# Patient Record
Sex: MALE | Race: BLACK OR AFRICAN AMERICAN | NOT HISPANIC OR LATINO | URBAN - METROPOLITAN AREA
[De-identification: names, ages, dates, MRNs, and addresses within clinical notes are randomized per-mention and may not be internally consistent; named-entity substitution may affect disease eponyms.]

---

## 2016-05-17 RX ORDER — QUETIAPINE FUMARATE 200 MG/1
3 TABLET, FILM COATED ORAL
Qty: 30 | Refills: 0 | DISCHARGE
Start: 2016-05-17 | End: 2016-06-16

## 2017-03-25 ENCOUNTER — EMERGENCY (EMERGENCY)
Facility: HOSPITAL | Age: 57
LOS: 1 days | Discharge: OTHER INSTITUTION | End: 2017-03-25
Attending: EMERGENCY MEDICINE
Payer: MEDICAID

## 2017-03-25 VITALS
WEIGHT: 229.94 LBS | RESPIRATION RATE: 20 BRPM | OXYGEN SATURATION: 99 % | SYSTOLIC BLOOD PRESSURE: 174 MMHG | HEIGHT: 72 IN | HEART RATE: 96 BPM | DIASTOLIC BLOOD PRESSURE: 108 MMHG | TEMPERATURE: 98 F

## 2017-03-25 VITALS
SYSTOLIC BLOOD PRESSURE: 112 MMHG | RESPIRATION RATE: 20 BRPM | HEART RATE: 61 BPM | DIASTOLIC BLOOD PRESSURE: 77 MMHG | OXYGEN SATURATION: 98 % | TEMPERATURE: 98 F

## 2017-03-25 DIAGNOSIS — Z98.890 OTHER SPECIFIED POSTPROCEDURAL STATES: ICD-10-CM

## 2017-03-25 DIAGNOSIS — Z96.652 PRESENCE OF LEFT ARTIFICIAL KNEE JOINT: ICD-10-CM

## 2017-03-25 DIAGNOSIS — Z98.89 OTHER SPECIFIED POSTPROCEDURAL STATES: Chronic | ICD-10-CM

## 2017-03-25 DIAGNOSIS — F20.0 PARANOID SCHIZOPHRENIA: ICD-10-CM

## 2017-03-25 DIAGNOSIS — R69 ILLNESS, UNSPECIFIED: ICD-10-CM

## 2017-03-25 DIAGNOSIS — F32.9 MAJOR DEPRESSIVE DISORDER, SINGLE EPISODE, UNSPECIFIED: ICD-10-CM

## 2017-03-25 DIAGNOSIS — J45.909 UNSPECIFIED ASTHMA, UNCOMPLICATED: ICD-10-CM

## 2017-03-25 DIAGNOSIS — I10 ESSENTIAL (PRIMARY) HYPERTENSION: ICD-10-CM

## 2017-03-25 DIAGNOSIS — E78.00 PURE HYPERCHOLESTEROLEMIA, UNSPECIFIED: ICD-10-CM

## 2017-03-25 DIAGNOSIS — Z96.652 PRESENCE OF LEFT ARTIFICIAL KNEE JOINT: Chronic | ICD-10-CM

## 2017-03-25 LAB
AMPHET UR-MCNC: NEGATIVE — SIGNIFICANT CHANGE UP
ANION GAP SERPL CALC-SCNC: 15 MMOL/L — SIGNIFICANT CHANGE UP (ref 5–17)
APAP SERPL-MCNC: <7.5 UG/ML — LOW (ref 10–26)
APPEARANCE UR: CLEAR — SIGNIFICANT CHANGE UP
BACTERIA # UR AUTO: ABNORMAL
BARBITURATES UR SCN-MCNC: NEGATIVE — SIGNIFICANT CHANGE UP
BASOPHILS # BLD AUTO: 0 K/UL — SIGNIFICANT CHANGE UP (ref 0–0.2)
BASOPHILS NFR BLD AUTO: 0.2 % — SIGNIFICANT CHANGE UP (ref 0–2)
BENZODIAZ UR-MCNC: NEGATIVE — SIGNIFICANT CHANGE UP
BILIRUB UR-MCNC: NEGATIVE — SIGNIFICANT CHANGE UP
BUN SERPL-MCNC: 17 MG/DL — SIGNIFICANT CHANGE UP (ref 8–20)
CALCIUM SERPL-MCNC: 9.2 MG/DL — SIGNIFICANT CHANGE UP (ref 8.6–10.2)
CHLORIDE SERPL-SCNC: 105 MMOL/L — SIGNIFICANT CHANGE UP (ref 98–107)
CO2 SERPL-SCNC: 21 MMOL/L — LOW (ref 22–29)
COCAINE METAB.OTHER UR-MCNC: POSITIVE
COLOR SPEC: YELLOW — SIGNIFICANT CHANGE UP
CREAT SERPL-MCNC: 1.06 MG/DL — SIGNIFICANT CHANGE UP (ref 0.5–1.3)
DIFF PNL FLD: NEGATIVE — SIGNIFICANT CHANGE UP
EPI CELLS # UR: SIGNIFICANT CHANGE UP
ETHANOL SERPL-MCNC: 24 MG/DL — SIGNIFICANT CHANGE UP
GLUCOSE SERPL-MCNC: 108 MG/DL — SIGNIFICANT CHANGE UP (ref 70–115)
GLUCOSE UR QL: NEGATIVE MG/DL — SIGNIFICANT CHANGE UP
HCT VFR BLD CALC: 40.3 % — LOW (ref 42–52)
HGB BLD-MCNC: 13 G/DL — LOW (ref 14–18)
KETONES UR-MCNC: NEGATIVE — SIGNIFICANT CHANGE UP
LEUKOCYTE ESTERASE UR-ACNC: ABNORMAL
LYMPHOCYTES # BLD AUTO: 1 K/UL — SIGNIFICANT CHANGE UP (ref 1–4.8)
LYMPHOCYTES # BLD AUTO: 9.9 % — LOW (ref 20–55)
MCHC RBC-ENTMCNC: 29.3 PG — SIGNIFICANT CHANGE UP (ref 27–31)
MCHC RBC-ENTMCNC: 32.3 G/DL — SIGNIFICANT CHANGE UP (ref 32–36)
MCV RBC AUTO: 90.8 FL — SIGNIFICANT CHANGE UP (ref 80–94)
METHADONE UR-MCNC: NEGATIVE — SIGNIFICANT CHANGE UP
MONOCYTES # BLD AUTO: 0.7 K/UL — SIGNIFICANT CHANGE UP (ref 0–0.8)
MONOCYTES NFR BLD AUTO: 6.8 % — SIGNIFICANT CHANGE UP (ref 3–10)
NEUTROPHILS # BLD AUTO: 8.5 K/UL — HIGH (ref 1.8–8)
NEUTROPHILS NFR BLD AUTO: 82.9 % — HIGH (ref 37–73)
NITRITE UR-MCNC: NEGATIVE — SIGNIFICANT CHANGE UP
OPIATES UR-MCNC: NEGATIVE — SIGNIFICANT CHANGE UP
PCP SPEC-MCNC: SIGNIFICANT CHANGE UP
PCP UR-MCNC: NEGATIVE — SIGNIFICANT CHANGE UP
PH UR: 7 — SIGNIFICANT CHANGE UP (ref 4.8–8)
PLATELET # BLD AUTO: 371 K/UL — SIGNIFICANT CHANGE UP (ref 150–400)
POTASSIUM SERPL-MCNC: 4 MMOL/L — SIGNIFICANT CHANGE UP (ref 3.5–5.3)
POTASSIUM SERPL-SCNC: 4 MMOL/L — SIGNIFICANT CHANGE UP (ref 3.5–5.3)
PROT UR-MCNC: NEGATIVE MG/DL — SIGNIFICANT CHANGE UP
RBC # BLD: 4.44 M/UL — LOW (ref 4.6–6.2)
RBC # FLD: 12.8 % — SIGNIFICANT CHANGE UP (ref 11–15.6)
SALICYLATES SERPL-MCNC: <2 MG/DL — LOW (ref 10–20)
SODIUM SERPL-SCNC: 141 MMOL/L — SIGNIFICANT CHANGE UP (ref 135–145)
SP GR SPEC: 1.01 — SIGNIFICANT CHANGE UP (ref 1.01–1.02)
THC UR QL: NEGATIVE — SIGNIFICANT CHANGE UP
UROBILINOGEN FLD QL: NEGATIVE MG/DL — SIGNIFICANT CHANGE UP
WBC # BLD: 10.3 K/UL — SIGNIFICANT CHANGE UP (ref 4.8–10.8)
WBC # FLD AUTO: 10.3 K/UL — SIGNIFICANT CHANGE UP (ref 4.8–10.8)
WBC UR QL: ABNORMAL

## 2017-03-25 PROCEDURE — 99285 EMERGENCY DEPT VISIT HI MDM: CPT

## 2017-03-25 PROCEDURE — 96372 THER/PROPH/DIAG INJ SC/IM: CPT

## 2017-03-25 PROCEDURE — 81001 URINALYSIS AUTO W/SCOPE: CPT

## 2017-03-25 PROCEDURE — 99285 EMERGENCY DEPT VISIT HI MDM: CPT | Mod: 25

## 2017-03-25 PROCEDURE — 93005 ELECTROCARDIOGRAM TRACING: CPT

## 2017-03-25 PROCEDURE — 85027 COMPLETE CBC AUTOMATED: CPT

## 2017-03-25 PROCEDURE — 93010 ELECTROCARDIOGRAM REPORT: CPT

## 2017-03-25 PROCEDURE — 80048 BASIC METABOLIC PNL TOTAL CA: CPT

## 2017-03-25 PROCEDURE — 90792 PSYCH DIAG EVAL W/MED SRVCS: CPT

## 2017-03-25 PROCEDURE — 80307 DRUG TEST PRSMV CHEM ANLYZR: CPT

## 2017-03-25 RX ORDER — OLANZAPINE 15 MG/1
20 TABLET, FILM COATED ORAL ONCE
Qty: 0 | Refills: 0 | Status: COMPLETED | OUTPATIENT
Start: 2017-03-25 | End: 2017-03-25

## 2017-03-25 RX ORDER — QUETIAPINE FUMARATE 200 MG/1
200 TABLET, FILM COATED ORAL ONCE
Qty: 0 | Refills: 0 | Status: COMPLETED | OUTPATIENT
Start: 2017-03-25 | End: 2017-03-25

## 2017-03-25 RX ADMIN — OLANZAPINE 20 MILLIGRAM(S): 15 TABLET, FILM COATED ORAL at 17:00

## 2017-03-25 RX ADMIN — Medication 0.2 MILLIGRAM(S): at 14:26

## 2017-03-25 RX ADMIN — Medication 2 MILLIGRAM(S): at 21:10

## 2017-03-25 RX ADMIN — Medication 1 MILLIGRAM(S): at 14:26

## 2017-03-25 NOTE — ED ADULT NURSE REASSESSMENT NOTE - CONDITION
deteriorated/Pt informed of transfer to inpatient psych unit. Became irrate, threatening staff, reporting to get violent if he didn't get released immediately.  Pt given Zyprexa 20 mg  PO. Pt keeps asking for his clothes and how long he will remain in hospital. Does not acknowledge making a threat when talking to [facility] psychiatrist today.

## 2017-03-25 NOTE — ED ADULT NURSE REASSESSMENT NOTE - CONDITION
Pt is angry but patieently waiting for all information "to verify my story" Pt insists he never said  anything to get him brought here. Pt called his girlfriend Era and had a nice civil conversation. He requested and given a ginger ale. Does not want to watch TV./unchanged
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
## 2017-03-25 NOTE — ED ADULT NURSE NOTE - CHPI ED SYMPTOMS NEG
no homicidal/no change in level of consciousness/no confusion/no suicidal/no paranoia/no agitation/no disorientation/no weakness/no weight loss/no hallucinations

## 2017-03-25 NOTE — ED PROVIDER NOTE - OBJECTIVE STATEMENT
55 y/o male in ED c/o feeling depressed.  pt states just moved from [place] and has not taken his BP and depression meds x 1 month.  pt states "I just want to speak with someone".  pt denies any fever, HA, cp, sob, n/v/d/abd pain.  pt also denies any suicidal or homicidal ideas.  pt denies any drugs or alcohol use

## 2017-03-25 NOTE — ED ADULT NURSE NOTE - PMH
Anxiety    Asthma  (mild) never had a attach recently, use the puffs only when needed  Bipolar disorder    Chronic sinusitis    Depression    High cholesterol    HTN (hypertension)    Nasal polyps

## 2017-03-25 NOTE — ED ADULT NURSE NOTE - CHIEF COMPLAINT QUOTE
pt bibems and scpd 2727 for psych eval, pt with hx of schizophrenia and states he has not been taking his meds because they don't make him feel good, denies SI and HI at this time

## 2017-03-25 NOTE — ED BEHAVIORAL HEALTH ASSESSMENT NOTE - OTHER
girlfriend recent med non compliance paranoid trend to thought process pending bed ID paranoid trend to thought process, markedly paranoid at times

## 2017-03-25 NOTE — ED ADULT NURSE REASSESSMENT NOTE - CONDITION
Ptagitated and violently refusing required EKG to transfer to [facility]. Required hands on to do EKG. Pt given Ativan IM to remain calm throughout exam. EKG done and given to Jennifer MARTINEZ . Pt remains on 1:1 for observations . returned to sleep./unchanged
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
## 2017-03-25 NOTE — ED PROVIDER NOTE - PROGRESS NOTE DETAILS
case d/w Lili (psych NP) and will evaluate pt at  pt evlauated by psych and pt stating "someone is going to die today".  pt combative and aggressive at .  will require involuntary transfer for in patient treatment

## 2017-03-25 NOTE — ED BEHAVIORAL HEALTH ASSESSMENT NOTE - OTHER PAST PSYCHIATRIC HISTORY (INCLUDE DETAILS REGARDING ONSET, COURSE OF ILLNESS, INPATIENT/OUTPATIENT TREATMENT)
Positive for history of multiple hospitalizations, the most recent was per patient over 1 year ago. Pt reports this was at [facility] Positive for history of multiple hospitalizations, the most recent was  over 1 year ago. Pt reports this was at [facility]

## 2017-03-25 NOTE — ED ADULT TRIAGE NOTE - CHIEF COMPLAINT QUOTE
pt bibems and scpd 1040 for psych eval, pt with hx of schizophrenia and states he has not been taking his meds because they don't make him feel good pt bibems and scpd 5203 for psych eval, pt with hx of schizophrenia and states he has not been taking his meds because they don't make him feel good, denies SI and HI at this time

## 2017-03-25 NOTE — ED BEHAVIORAL HEALTH ASSESSMENT NOTE - SUMMARY
56 year old male with history of paranoid schizophrenia, called his provider today for med renewal. Made reported threats "someone will get hurt". Admits to recent non compliance with meds while in [place]. Patient evasive, markedly paranoid, answering most questions in 1 or 2 words. positive for recent alcohol and cocaine use.

## 2017-03-25 NOTE — ED BEHAVIORAL HEALTH ASSESSMENT NOTE - DESCRIPTION
Labs pending, awaiting contact with patient's girlfriend for clarifying information. HTN, asthma, HLD, sinusitis Patient lives with his girlfriend

## 2017-03-25 NOTE — ED PROVIDER NOTE - CARE PLAN
Principal Discharge DX:	Schizophrenia, paranoid type  Secondary Diagnosis:	Deferred condition on axis II

## 2017-03-25 NOTE — ED BEHAVIORAL HEALTH ASSESSMENT NOTE - CURRENT MEDICATION
Seroquel 2oomg po hs, olanzapine 20mg po hs, benztropine 1mg po bid, trazodone 100mg po hs Seroquel 200mg po hs, olanzapine 20mg po hs, benztropine 1mg po bid, trazodone 100mg po hs

## 2017-03-26 NOTE — ED BEHAVIORAL HEALTH NOTE - BEHAVIORAL HEALTH NOTE
J CARLOS Note- J CARLOS contacted 's insurance company, [employer], [phone]. J CARLOS spoke withLes and provided clinical information. Auth obtained for 3/25-3/30, 6 days, #[id number]. J CARLOS relayed this information to Amanda garcia [facility] and sent the necessary info via email as well.

## 2017-05-10 ENCOUNTER — APPOINTMENT (OUTPATIENT)
Dept: GASTROENTEROLOGY | Facility: CLINIC | Age: 57
End: 2017-05-10

## 2017-06-16 ENCOUNTER — OTHER (OUTPATIENT)
Age: 57
End: 2017-06-16

## 2017-07-01 ENCOUNTER — EMERGENCY (EMERGENCY)
Facility: HOSPITAL | Age: 57
LOS: 1 days | Discharge: HOME | End: 2017-07-01
Attending: EMERGENCY MEDICINE
Payer: MEDICAID

## 2017-07-01 VITALS
RESPIRATION RATE: 16 BRPM | OXYGEN SATURATION: 98 % | SYSTOLIC BLOOD PRESSURE: 144 MMHG | HEART RATE: 73 BPM | WEIGHT: 229.94 LBS | HEIGHT: 72 IN | DIASTOLIC BLOOD PRESSURE: 95 MMHG | TEMPERATURE: 97 F

## 2017-07-01 VITALS
HEART RATE: 62 BPM | DIASTOLIC BLOOD PRESSURE: 86 MMHG | TEMPERATURE: 98 F | SYSTOLIC BLOOD PRESSURE: 146 MMHG | OXYGEN SATURATION: 98 % | RESPIRATION RATE: 18 BRPM

## 2017-07-01 DIAGNOSIS — Z98.89 OTHER SPECIFIED POSTPROCEDURAL STATES: Chronic | ICD-10-CM

## 2017-07-01 DIAGNOSIS — Z96.652 PRESENCE OF LEFT ARTIFICIAL KNEE JOINT: Chronic | ICD-10-CM

## 2017-07-01 LAB
ALBUMIN SERPL ELPH-MCNC: 4.4 G/DL — SIGNIFICANT CHANGE UP (ref 3.3–5.2)
ALP SERPL-CCNC: 84 U/L — SIGNIFICANT CHANGE UP (ref 40–120)
ALT FLD-CCNC: 11 U/L — SIGNIFICANT CHANGE UP
ANION GAP SERPL CALC-SCNC: 12 MMOL/L — SIGNIFICANT CHANGE UP (ref 5–17)
APPEARANCE UR: CLEAR — SIGNIFICANT CHANGE UP
AST SERPL-CCNC: 14 U/L — SIGNIFICANT CHANGE UP
BACTERIA # UR AUTO: ABNORMAL
BILIRUB SERPL-MCNC: 0.6 MG/DL — SIGNIFICANT CHANGE UP (ref 0.4–2)
BILIRUB UR-MCNC: NEGATIVE — SIGNIFICANT CHANGE UP
BUN SERPL-MCNC: 10 MG/DL — SIGNIFICANT CHANGE UP (ref 8–20)
CALCIUM SERPL-MCNC: 9.3 MG/DL — SIGNIFICANT CHANGE UP (ref 8.6–10.2)
CHLORIDE SERPL-SCNC: 106 MMOL/L — SIGNIFICANT CHANGE UP (ref 98–107)
CO2 SERPL-SCNC: 22 MMOL/L — SIGNIFICANT CHANGE UP (ref 22–29)
COLOR SPEC: YELLOW — SIGNIFICANT CHANGE UP
CREAT SERPL-MCNC: 0.87 MG/DL — SIGNIFICANT CHANGE UP (ref 0.5–1.3)
DIFF PNL FLD: ABNORMAL
GLUCOSE SERPL-MCNC: 104 MG/DL — SIGNIFICANT CHANGE UP (ref 70–115)
GLUCOSE UR QL: NEGATIVE MG/DL — SIGNIFICANT CHANGE UP
HCT VFR BLD CALC: 44.6 % — SIGNIFICANT CHANGE UP (ref 42–52)
HGB BLD-MCNC: 15 G/DL — SIGNIFICANT CHANGE UP (ref 14–18)
KETONES UR-MCNC: NEGATIVE — SIGNIFICANT CHANGE UP
LEUKOCYTE ESTERASE UR-ACNC: ABNORMAL
LIDOCAIN IGE QN: 27 U/L — SIGNIFICANT CHANGE UP (ref 22–51)
MCHC RBC-ENTMCNC: 29 PG — SIGNIFICANT CHANGE UP (ref 27–31)
MCHC RBC-ENTMCNC: 33.6 G/DL — SIGNIFICANT CHANGE UP (ref 32–36)
MCV RBC AUTO: 86.1 FL — SIGNIFICANT CHANGE UP (ref 80–94)
NITRITE UR-MCNC: NEGATIVE — SIGNIFICANT CHANGE UP
PH UR: 6 — SIGNIFICANT CHANGE UP (ref 5–8)
PLATELET # BLD AUTO: 306 K/UL — SIGNIFICANT CHANGE UP (ref 150–400)
POTASSIUM SERPL-MCNC: 4 MMOL/L — SIGNIFICANT CHANGE UP (ref 3.5–5.3)
POTASSIUM SERPL-SCNC: 4 MMOL/L — SIGNIFICANT CHANGE UP (ref 3.5–5.3)
PROT SERPL-MCNC: 7.2 G/DL — SIGNIFICANT CHANGE UP (ref 6.6–8.7)
PROT UR-MCNC: NEGATIVE MG/DL — SIGNIFICANT CHANGE UP
RBC # BLD: 5.18 M/UL — SIGNIFICANT CHANGE UP (ref 4.6–6.2)
RBC # FLD: 12.9 % — SIGNIFICANT CHANGE UP (ref 11–15.6)
RBC CASTS # UR COMP ASSIST: SIGNIFICANT CHANGE UP /HPF (ref 0–4)
SODIUM SERPL-SCNC: 140 MMOL/L — SIGNIFICANT CHANGE UP (ref 135–145)
SP GR SPEC: 1.02 — SIGNIFICANT CHANGE UP (ref 1.01–1.02)
UROBILINOGEN FLD QL: NEGATIVE MG/DL — SIGNIFICANT CHANGE UP
WBC # BLD: 6.8 K/UL — SIGNIFICANT CHANGE UP (ref 4.8–10.8)
WBC # FLD AUTO: 6.8 K/UL — SIGNIFICANT CHANGE UP (ref 4.8–10.8)
WBC UR QL: >50

## 2017-07-01 PROCEDURE — 96372 THER/PROPH/DIAG INJ SC/IM: CPT

## 2017-07-01 PROCEDURE — 36415 COLL VENOUS BLD VENIPUNCTURE: CPT

## 2017-07-01 PROCEDURE — 76857 US EXAM PELVIC LIMITED: CPT | Mod: 26

## 2017-07-01 PROCEDURE — 99284 EMERGENCY DEPT VISIT MOD MDM: CPT | Mod: 25

## 2017-07-01 PROCEDURE — 76870 US EXAM SCROTUM: CPT

## 2017-07-01 PROCEDURE — 80053 COMPREHEN METABOLIC PANEL: CPT

## 2017-07-01 PROCEDURE — 76870 US EXAM SCROTUM: CPT | Mod: 26

## 2017-07-01 PROCEDURE — 76857 US EXAM PELVIC LIMITED: CPT

## 2017-07-01 PROCEDURE — 87186 SC STD MICRODIL/AGAR DIL: CPT

## 2017-07-01 PROCEDURE — 85027 COMPLETE CBC AUTOMATED: CPT

## 2017-07-01 PROCEDURE — 81001 URINALYSIS AUTO W/SCOPE: CPT

## 2017-07-01 PROCEDURE — 87086 URINE CULTURE/COLONY COUNT: CPT

## 2017-07-01 PROCEDURE — 83690 ASSAY OF LIPASE: CPT

## 2017-07-01 RX ORDER — AZITHROMYCIN 500 MG/1
1000 TABLET, FILM COATED ORAL ONCE
Qty: 0 | Refills: 0 | Status: COMPLETED | OUTPATIENT
Start: 2017-07-01 | End: 2017-07-01

## 2017-07-01 RX ORDER — CEFTRIAXONE 500 MG/1
250 INJECTION, POWDER, FOR SOLUTION INTRAMUSCULAR; INTRAVENOUS ONCE
Qty: 0 | Refills: 0 | Status: COMPLETED | OUTPATIENT
Start: 2017-07-01 | End: 2017-07-01

## 2017-07-01 RX ORDER — ACETAMINOPHEN 500 MG
650 TABLET ORAL ONCE
Qty: 0 | Refills: 0 | Status: COMPLETED | OUTPATIENT
Start: 2017-07-01 | End: 2017-07-01

## 2017-07-01 RX ORDER — KETOROLAC TROMETHAMINE 30 MG/ML
15 SYRINGE (ML) INJECTION ONCE
Qty: 0 | Refills: 0 | Status: DISCONTINUED | OUTPATIENT
Start: 2017-07-01 | End: 2017-07-01

## 2017-07-01 RX ADMIN — AZITHROMYCIN 1000 MILLIGRAM(S): 500 TABLET, FILM COATED ORAL at 12:32

## 2017-07-01 RX ADMIN — CEFTRIAXONE 250 MILLIGRAM(S): 500 INJECTION, POWDER, FOR SOLUTION INTRAMUSCULAR; INTRAVENOUS at 12:32

## 2017-07-01 NOTE — ED ADULT NURSE REASSESSMENT NOTE - NS ED NURSE REASSESS COMMENT FT1
pt sitting up comfortably in stretcher. pt dressed back into clothes. pt does not want to get changed into gown. pt instructed not to leave ED with IV in place. pt verbalized understanding. pt denies pain medications at this time. will continue to monitor and reassess

## 2017-07-01 NOTE — ED PROVIDER NOTE - OBJECTIVE STATEMENT
This is a 56M complaining of L-inguinal and groin pain that feels similar to his prior inguinal hernia x 1-2 months. It was repaired last year with mesh and he hasn't had recurrence. He describes it as an aching pain that is worse when he is briskly walking and better when he is laying down. It radiates into his testicle. He denies fevers, chills, dysuria, hematuria, foul-smelling urine or penile discharge. He has been sexually active with several partners in the past several months and does not always use condoms. He does not have pain on eating, nausea, vomiting, constipation or change in other bowel or habits.

## 2017-07-01 NOTE — ED PROVIDER NOTE - PLAN OF CARE
You were seen and evaluated in the emergency department for groin pain. You did not have evidence of a recurrent hernia. Your testicle ultrasound was normal. You were treated empirically for an infection called epididymitis, but you will need to follow up with your regular doctor in 2-3 days to review the culture results. We will refer you for a urologist to evaluate you for testicular pain. Please return for worsening pain, fevers, chills, vomiting, diarrhea, severe worsening pain or other concerning new symptoms.    You may take up to 600mg of ibuprofen (Motrin, Advil) every 6 hours as needed for pain. Please take ibuprofen with food if possible to prevent stomach upset. You may also take up to 1000mg of acetaminophen (Tylenol) every 6 hours as needed for pain. It is ok to mix these medications with each other. Do not mix them with other pain medications such as naproxen (Alieve) or asprin unless specifically instructed by your doctor. Many prescription pain medications and cough medications contain acetaminophen. If you take these medications, please discuss with your provider or primary care doctor if you need to adjust the dose of acetaminophen you can take.     Please read the attached information sheets. possible epidydimitis

## 2017-07-01 NOTE — ED PROVIDER NOTE - PROGRESS NOTE DETAILS
US reassuring, pt declining all pain Rx. He does not want to wait for UA results. Will treat empirically for DC, pt will be referred to urology and PCP for f/u. pt left prior to receiving DC instructions though was cleared for discharge. s/p STI treatment, urine culture will need to be followed up UA+, likely epididymitis/orchitis. given pt is sexually active and young likely STI, though will grow out UCx and GenProbe results for confirmation. TO be followed up by PA.

## 2017-07-01 NOTE — ED ADULT NURSE NOTE - OBJECTIVE STATEMENT
pt presents to ED with lower abd pain x few months. pt states he had hernia repair in same area as pain. denies n/v/d, afebrile. denies urinary complaints. breathing si even and unlabored. a&ox3 PERRL. maex4 lungs cta. abd soft nt nd +bs denies n/v/d/ skin w/d/i. will continue to monitor and reassess

## 2017-07-01 NOTE — ED PROVIDER NOTE - CARE PLAN
Principal Discharge DX:	Inguinal pain, left  Instructions for follow-up, activity and diet:	You were seen and evaluated in the emergency department for groin pain. You did not have evidence of a recurrent hernia. Your testicle ultrasound was normal. You were treated empirically for an infection called epididymitis, but you will need to follow up with your regular doctor in 2-3 days to review the culture results. We will refer you for a urologist to evaluate you for testicular pain. Please return for worsening pain, fevers, chills, vomiting, diarrhea, severe worsening pain or other concerning new symptoms.    You may take up to 600mg of ibuprofen (Motrin, Advil) every 6 hours as needed for pain. Please take ibuprofen with food if possible to prevent stomach upset. You may also take up to 1000mg of acetaminophen (Tylenol) every 6 hours as needed for pain. It is ok to mix these medications with each other. Do not mix them with other pain medications such as naproxen (Alieve) or asprin unless specifically instructed by your doctor. Many prescription pain medications and cough medications contain acetaminophen. If you take these medications, please discuss with your provider or primary care doctor if you need to adjust the dose of acetaminophen you can take.     Please read the attached information sheets.  Secondary Diagnosis:	Testicle pain Principal Discharge DX:	Inguinal pain, left  Goal:	possible epidydimitis  Instructions for follow-up, activity and diet:	You were seen and evaluated in the emergency department for groin pain. You did not have evidence of a recurrent hernia. Your testicle ultrasound was normal. You were treated empirically for an infection called epididymitis, but you will need to follow up with your regular doctor in 2-3 days to review the culture results. We will refer you for a urologist to evaluate you for testicular pain. Please return for worsening pain, fevers, chills, vomiting, diarrhea, severe worsening pain or other concerning new symptoms.    You may take up to 600mg of ibuprofen (Motrin, Advil) every 6 hours as needed for pain. Please take ibuprofen with food if possible to prevent stomach upset. You may also take up to 1000mg of acetaminophen (Tylenol) every 6 hours as needed for pain. It is ok to mix these medications with each other. Do not mix them with other pain medications such as naproxen (Alieve) or asprin unless specifically instructed by your doctor. Many prescription pain medications and cough medications contain acetaminophen. If you take these medications, please discuss with your provider or primary care doctor if you need to adjust the dose of acetaminophen you can take.     Please read the attached information sheets.  Secondary Diagnosis:	Testicle pain

## 2017-07-01 NOTE — ED PROVIDER NOTE - MEDICAL DECISION MAKING DETAILS
No palpable hernia, TTP over L-spermatic cord, will get US for hernia/epididymitis. Sexually active no other symptoms, may be STI.

## 2017-07-01 NOTE — ED PROVIDER NOTE - NS ED ROS FT
CONST: no fevers, no chills, no trauma  EYES: no pain, no visual disturbances  ENT: no sore throat, no epistaxis, no rhinorhea, no hearing changes  CV: no chest pain, no palpitations, no orthopnea, no extremity pain or swelling  RESP: no shortness of breath, no cough, no sputum, no pleurisy, no wheezing  ABD: L-inguinal pain, no other pain, no nausea, no vomiting, no diarrhea, no black or bloody stool, tolerating PO well  : testicle pain  MSK: no back pain, no neck pain, no extremity pain  NEURO: no headache, no sensory disturbances, no focal weakness, no dizziness  HEME: no easy bleeding or bruising  SKIN: no diaphoresis, no rash CONST: no fevers, no chills, no trauma  EYES: no pain, no visual disturbances  ENT: no sore throat, no epistaxis, no rhinorhea, no hearing changes  CV: no chest pain, no palpitations, no orthopnea, no extremity pain or swelling  RESP: no shortness of breath, no cough, no sputum, no pleurisy, no wheezing  ABD: L-inguinal pain, no other pain, no nausea, no vomiting, no diarrhea, no black or bloody stool, tolerating PO well  : testicle pain, see HPI  MSK: no back pain, no neck pain, no extremity pain  NEURO: no headache, no sensory disturbances, no focal weakness, no dizziness  HEME: no easy bleeding or bruising  SKIN: no diaphoresis, no rash

## 2017-07-03 LAB
-  AMPICILLIN: SIGNIFICANT CHANGE UP
-  NITROFURANTOIN: SIGNIFICANT CHANGE UP
-  TETRACYCLINE: SIGNIFICANT CHANGE UP
-  VANCOMYCIN: SIGNIFICANT CHANGE UP
C TRACH RRNA SPEC QL NAA+PROBE: SIGNIFICANT CHANGE UP
CULTURE RESULTS: SIGNIFICANT CHANGE UP
METHOD TYPE: SIGNIFICANT CHANGE UP
N GONORRHOEA RRNA SPEC QL NAA+PROBE: SIGNIFICANT CHANGE UP
ORGANISM # SPEC MICROSCOPIC CNT: SIGNIFICANT CHANGE UP
ORGANISM # SPEC MICROSCOPIC CNT: SIGNIFICANT CHANGE UP
SPECIMEN SOURCE: SIGNIFICANT CHANGE UP
SPECIMEN SOURCE: SIGNIFICANT CHANGE UP

## 2017-11-29 PROBLEM — Z00.00 ENCOUNTER FOR PREVENTIVE HEALTH EXAMINATION: Noted: 2017-11-29

## 2017-12-13 ENCOUNTER — RECORD ABSTRACTING (OUTPATIENT)
Age: 57
End: 2017-12-13

## 2017-12-13 ENCOUNTER — APPOINTMENT (OUTPATIENT)
Dept: PULMONOLOGY | Facility: CLINIC | Age: 57
End: 2017-12-13
Payer: MEDICAID

## 2017-12-13 ENCOUNTER — FORM ENCOUNTER (OUTPATIENT)
Age: 57
End: 2017-12-13

## 2017-12-13 VITALS
SYSTOLIC BLOOD PRESSURE: 130 MMHG | BODY MASS INDEX: 31.97 KG/M2 | HEIGHT: 72 IN | WEIGHT: 236 LBS | HEART RATE: 75 BPM | DIASTOLIC BLOOD PRESSURE: 68 MMHG

## 2017-12-13 DIAGNOSIS — F17.200 NICOTINE DEPENDENCE, UNSPECIFIED, UNCOMPLICATED: ICD-10-CM

## 2017-12-13 DIAGNOSIS — Z82.49 FAMILY HISTORY OF ISCHEMIC HEART DISEASE AND OTHER DISEASES OF THE CIRCULATORY SYSTEM: ICD-10-CM

## 2017-12-13 DIAGNOSIS — Z80.42 FAMILY HISTORY OF MALIGNANT NEOPLASM OF PROSTATE: ICD-10-CM

## 2017-12-13 DIAGNOSIS — G47.33 OBSTRUCTIVE SLEEP APNEA (ADULT) (PEDIATRIC): ICD-10-CM

## 2017-12-13 DIAGNOSIS — Z12.2 ENCOUNTER FOR SCREENING FOR MALIGNANT NEOPLASM OF RESPIRATORY ORGANS: ICD-10-CM

## 2017-12-13 DIAGNOSIS — Z80.3 FAMILY HISTORY OF MALIGNANT NEOPLASM OF BREAST: ICD-10-CM

## 2017-12-13 DIAGNOSIS — R93.8 ABNORMAL FINDINGS ON DIAGNOSTIC IMAGING OF OTHER SPECIFIED BODY STRUCTURES: ICD-10-CM

## 2017-12-13 PROCEDURE — 94010 BREATHING CAPACITY TEST: CPT

## 2017-12-13 PROCEDURE — 99215 OFFICE O/P EST HI 40 MIN: CPT | Mod: 25

## 2017-12-13 RX ORDER — ALBUTEROL SULFATE 90 UG/1
108 (90 BASE) AEROSOL, METERED RESPIRATORY (INHALATION)
Refills: 0 | Status: DISCONTINUED | COMMUNITY
End: 2017-12-13

## 2017-12-14 ENCOUNTER — OUTPATIENT (OUTPATIENT)
Dept: OUTPATIENT SERVICES | Facility: HOSPITAL | Age: 57
LOS: 1 days | End: 2017-12-14
Payer: MEDICAID

## 2017-12-14 ENCOUNTER — APPOINTMENT (OUTPATIENT)
Dept: CT IMAGING | Facility: CLINIC | Age: 57
End: 2017-12-14
Payer: MEDICAID

## 2017-12-14 DIAGNOSIS — Z98.89 OTHER SPECIFIED POSTPROCEDURAL STATES: Chronic | ICD-10-CM

## 2017-12-14 DIAGNOSIS — Z96.652 PRESENCE OF LEFT ARTIFICIAL KNEE JOINT: Chronic | ICD-10-CM

## 2017-12-14 DIAGNOSIS — Z12.2 ENCOUNTER FOR SCREENING FOR MALIGNANT NEOPLASM OF RESPIRATORY ORGANS: ICD-10-CM

## 2017-12-14 PROCEDURE — G0297: CPT | Mod: 26

## 2018-06-13 ENCOUNTER — APPOINTMENT (OUTPATIENT)
Dept: PULMONOLOGY | Facility: CLINIC | Age: 58
End: 2018-06-13

## 2018-08-01 ENCOUNTER — OUTPATIENT (OUTPATIENT)
Dept: OUTPATIENT SERVICES | Facility: HOSPITAL | Age: 58
LOS: 1 days | End: 2018-08-01
Payer: MEDICAID

## 2018-08-01 DIAGNOSIS — Z98.89 OTHER SPECIFIED POSTPROCEDURAL STATES: Chronic | ICD-10-CM

## 2018-08-01 DIAGNOSIS — Z96.652 PRESENCE OF LEFT ARTIFICIAL KNEE JOINT: Chronic | ICD-10-CM

## 2018-08-01 DIAGNOSIS — Z76.89 PERSONS ENCOUNTERING HEALTH SERVICES IN OTHER SPECIFIED CIRCUMSTANCES: ICD-10-CM

## 2018-08-16 ENCOUNTER — EMERGENCY (EMERGENCY)
Facility: HOSPITAL | Age: 58
LOS: 1 days | Discharge: HOME | End: 2018-08-16
Attending: EMERGENCY MEDICINE
Payer: MEDICAID

## 2018-08-16 VITALS — HEIGHT: 72 IN | WEIGHT: 240.08 LBS

## 2018-08-16 DIAGNOSIS — Z96.652 PRESENCE OF LEFT ARTIFICIAL KNEE JOINT: ICD-10-CM

## 2018-08-16 DIAGNOSIS — Z96.652 PRESENCE OF LEFT ARTIFICIAL KNEE JOINT: Chronic | ICD-10-CM

## 2018-08-16 DIAGNOSIS — Z98.89 OTHER SPECIFIED POSTPROCEDURAL STATES: Chronic | ICD-10-CM

## 2018-08-16 DIAGNOSIS — E78.00 PURE HYPERCHOLESTEROLEMIA, UNSPECIFIED: ICD-10-CM

## 2018-08-16 DIAGNOSIS — Z76.0 ENCOUNTER FOR ISSUE OF REPEAT PRESCRIPTION: ICD-10-CM

## 2018-08-16 DIAGNOSIS — I10 ESSENTIAL (PRIMARY) HYPERTENSION: ICD-10-CM

## 2018-08-16 DIAGNOSIS — Z79.899 OTHER LONG TERM (CURRENT) DRUG THERAPY: ICD-10-CM

## 2018-08-16 DIAGNOSIS — F32.9 MAJOR DEPRESSIVE DISORDER, SINGLE EPISODE, UNSPECIFIED: ICD-10-CM

## 2018-08-16 DIAGNOSIS — J45.909 UNSPECIFIED ASTHMA, UNCOMPLICATED: ICD-10-CM

## 2018-08-16 DIAGNOSIS — Z90.49 ACQUIRED ABSENCE OF OTHER SPECIFIED PARTS OF DIGESTIVE TRACT: ICD-10-CM

## 2018-08-16 DIAGNOSIS — Z98.890 OTHER SPECIFIED POSTPROCEDURAL STATES: ICD-10-CM

## 2018-08-16 PROCEDURE — 99285 EMERGENCY DEPT VISIT HI MDM: CPT

## 2018-08-16 PROCEDURE — 99284 EMERGENCY DEPT VISIT MOD MDM: CPT | Mod: 25

## 2018-08-17 VITALS
TEMPERATURE: 98 F | OXYGEN SATURATION: 98 % | SYSTOLIC BLOOD PRESSURE: 108 MMHG | RESPIRATION RATE: 16 BRPM | DIASTOLIC BLOOD PRESSURE: 72 MMHG | HEART RATE: 66 BPM

## 2018-08-17 LAB
ALBUMIN SERPL ELPH-MCNC: 3.8 G/DL — SIGNIFICANT CHANGE UP (ref 3.3–5.2)
ALP SERPL-CCNC: 69 U/L — SIGNIFICANT CHANGE UP (ref 40–120)
ALT FLD-CCNC: 15 U/L — SIGNIFICANT CHANGE UP
AMPHET UR-MCNC: NEGATIVE — SIGNIFICANT CHANGE UP
ANION GAP SERPL CALC-SCNC: 13 MMOL/L — SIGNIFICANT CHANGE UP (ref 5–17)
AST SERPL-CCNC: 16 U/L — SIGNIFICANT CHANGE UP
BARBITURATES UR SCN-MCNC: NEGATIVE — SIGNIFICANT CHANGE UP
BASOPHILS # BLD AUTO: 0 K/UL — SIGNIFICANT CHANGE UP (ref 0–0.2)
BASOPHILS NFR BLD AUTO: 0.3 % — SIGNIFICANT CHANGE UP (ref 0–2)
BENZODIAZ UR-MCNC: NEGATIVE — SIGNIFICANT CHANGE UP
BILIRUB SERPL-MCNC: 1 MG/DL — SIGNIFICANT CHANGE UP (ref 0.4–2)
BUN SERPL-MCNC: 16 MG/DL — SIGNIFICANT CHANGE UP (ref 8–20)
CALCIUM SERPL-MCNC: 9.1 MG/DL — SIGNIFICANT CHANGE UP (ref 8.6–10.2)
CHLORIDE SERPL-SCNC: 104 MMOL/L — SIGNIFICANT CHANGE UP (ref 98–107)
CO2 SERPL-SCNC: 23 MMOL/L — SIGNIFICANT CHANGE UP (ref 22–29)
COCAINE METAB.OTHER UR-MCNC: POSITIVE
CREAT SERPL-MCNC: 0.95 MG/DL — SIGNIFICANT CHANGE UP (ref 0.5–1.3)
EOSINOPHIL # BLD AUTO: 0.1 K/UL — SIGNIFICANT CHANGE UP (ref 0–0.5)
EOSINOPHIL NFR BLD AUTO: 0.6 % — SIGNIFICANT CHANGE UP (ref 0–5)
ETHANOL SERPL-MCNC: <10 MG/DL — SIGNIFICANT CHANGE UP
GLUCOSE SERPL-MCNC: 123 MG/DL — HIGH (ref 70–115)
HCT VFR BLD CALC: 40 % — LOW (ref 42–52)
HGB BLD-MCNC: 13 G/DL — LOW (ref 14–18)
LYMPHOCYTES # BLD AUTO: 18.8 % — LOW (ref 20–55)
LYMPHOCYTES # BLD AUTO: 2.8 K/UL — SIGNIFICANT CHANGE UP (ref 1–4.8)
MCHC RBC-ENTMCNC: 28.2 PG — SIGNIFICANT CHANGE UP (ref 27–31)
MCHC RBC-ENTMCNC: 32.5 G/DL — SIGNIFICANT CHANGE UP (ref 32–36)
MCV RBC AUTO: 86.8 FL — SIGNIFICANT CHANGE UP (ref 80–94)
METHADONE UR-MCNC: NEGATIVE — SIGNIFICANT CHANGE UP
MONOCYTES # BLD AUTO: 1.5 K/UL — HIGH (ref 0–0.8)
MONOCYTES NFR BLD AUTO: 9.9 % — SIGNIFICANT CHANGE UP (ref 3–10)
NEUTROPHILS # BLD AUTO: 10.4 K/UL — HIGH (ref 1.8–8)
NEUTROPHILS NFR BLD AUTO: 70.1 % — SIGNIFICANT CHANGE UP (ref 37–73)
OPIATES UR-MCNC: NEGATIVE — SIGNIFICANT CHANGE UP
PCP SPEC-MCNC: SIGNIFICANT CHANGE UP
PCP UR-MCNC: NEGATIVE — SIGNIFICANT CHANGE UP
PLATELET # BLD AUTO: 322 K/UL — SIGNIFICANT CHANGE UP (ref 150–400)
POTASSIUM SERPL-MCNC: 3.7 MMOL/L — SIGNIFICANT CHANGE UP (ref 3.5–5.3)
POTASSIUM SERPL-SCNC: 3.7 MMOL/L — SIGNIFICANT CHANGE UP (ref 3.5–5.3)
PROT SERPL-MCNC: 6.9 G/DL — SIGNIFICANT CHANGE UP (ref 6.6–8.7)
RBC # BLD: 4.61 M/UL — SIGNIFICANT CHANGE UP (ref 4.6–6.2)
RBC # FLD: 13.2 % — SIGNIFICANT CHANGE UP (ref 11–15.6)
SODIUM SERPL-SCNC: 140 MMOL/L — SIGNIFICANT CHANGE UP (ref 135–145)
THC UR QL: NEGATIVE — SIGNIFICANT CHANGE UP
WBC # BLD: 14.8 K/UL — HIGH (ref 4.8–10.8)
WBC # FLD AUTO: 14.8 K/UL — HIGH (ref 4.8–10.8)

## 2018-08-17 PROCEDURE — 93005 ELECTROCARDIOGRAM TRACING: CPT

## 2018-08-17 PROCEDURE — 80307 DRUG TEST PRSMV CHEM ANLYZR: CPT

## 2018-08-17 PROCEDURE — 85027 COMPLETE CBC AUTOMATED: CPT

## 2018-08-17 PROCEDURE — 80053 COMPREHEN METABOLIC PANEL: CPT

## 2018-08-17 PROCEDURE — 99284 EMERGENCY DEPT VISIT MOD MDM: CPT

## 2018-08-17 PROCEDURE — 93010 ELECTROCARDIOGRAM REPORT: CPT

## 2018-08-17 PROCEDURE — 36415 COLL VENOUS BLD VENIPUNCTURE: CPT

## 2018-08-17 PROCEDURE — 72100 X-RAY EXAM L-S SPINE 2/3 VWS: CPT | Mod: 26

## 2018-08-17 PROCEDURE — 72100 X-RAY EXAM L-S SPINE 2/3 VWS: CPT

## 2018-08-17 RX ADMIN — Medication 500 MILLIGRAM(S): at 14:17

## 2018-08-17 NOTE — ED PROVIDER NOTE - ATTENDING CONTRIBUTION TO CARE
56 yo M with hx HTN, high cholesterol and bipolar/depression presents to ED requesting to be restarted on all his meds after being off all of them for last 6 months.  Pt denies any other c/o.  No SI/HI.  On exam pt awake and alert in NAD, Cor Reg, Lungs clear b/l, Abd soft, NT, Neuro intact.  Will check labs and EKG for psych clearance and eval by Telepsych

## 2018-08-17 NOTE — ED BEHAVIORAL HEALTH ASSESSMENT NOTE - OTHER
pending bed ID girlfriend restless paranoid trend to thought process recent med non compliance [id number]

## 2018-08-17 NOTE — ED ADULT NURSE NOTE - PSH
S/P exploratory laparotomy  (2011)  S/P hernia surgery  (2014)  S/P sinus surgery    Status post total left knee replacement

## 2018-08-17 NOTE — ED PROVIDER NOTE - OBJECTIVE STATEMENT
Pt is a 57y M with PMH of HTN/ Bipolar complaining of medication refill. Pt states he has been off his medication for 6 months and decided he needs to be on it. He denies any symptoms/SI/HI. His PCP is Dr. Laird. He takes zyprexa/trazodone/quetiapine/lisinopril/cogentin . No other complaints. Pt asking for a sandwich.

## 2018-08-17 NOTE — ED BEHAVIORAL HEALTH ASSESSMENT NOTE - PAST PSYCHOTROPIC MEDICATION
Patient reported INR from home monitoring system. Today INR is 3.0. Patient was informed per Dr Richelle BUSTAMANTE to take 5mg daily and repeat INR in 1 week per protocol . Patient verbalized understanding over the phone. No Mckeon RN.    Seroquel 200mg po hs, olanzapine 20mg po hs, benztropine 1mg po bid, trazodone 100mg po hs
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
## 2018-08-17 NOTE — ED ADULT NURSE REASSESSMENT NOTE - NS ED NURSE REASSESS COMMENT FT1
Assume care of pt. Pt resting on stretcher. Easily arousable. Resp even and unlabored. Denies pain at this time. Will continue to monitor.

## 2018-08-17 NOTE — ED BEHAVIORAL HEALTH ASSESSMENT NOTE - SUMMARY
57M hx schizophrenia, reporting symptoms of paranoia and AH in context of noncompliance with meds, Last in tx at [facility] over 1 yr ago. Denies si/hi. Relates fairly well; fairly organized; appropriate responses. May benefit from voluntary hospitalization.
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
## 2018-08-17 NOTE — ED PROVIDER NOTE - CHPI ED SYMPTOMS NEG
no pain/no tingling/no weakness/no dizziness/no chills/no decreased eating/drinking/no vomiting/no nausea/no fever/no numbness
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
## 2018-08-17 NOTE — ED BEHAVIORAL HEALTH ASSESSMENT NOTE - DESCRIPTION
HTN, asthma, HLD, sinusitis Patient lives with his father pending medical clearance, calm, cooperative

## 2018-08-17 NOTE — ED BEHAVIORAL HEALTH NOTE - BEHAVIORAL HEALTH NOTE
SWNote: 18:10 pt accepted at [facility] by Dr Diane on a 9.13 status . [facility] called ,lace within the hour(Renny).  21:10 Phone call to [facility] requesting info about [facility] status . As per [facility] is delayed due to emergency calls, new ETA within the hour. Worker informed  pt's nurse. SW to follow.

## 2018-08-17 NOTE — ED ADULT NURSE NOTE - OBJECTIVE STATEMENT
pt reports "I have been feeling down". pt reports eating less and sleeping more than usual. reports hx of bipolar. reports has stopped taking his seroquel and would like to start taking it again. denies drug or alcohol use. calm and cooperative. a and o x3. pt denies SI, HI. breathing even and unlabored. will continue to monitor.

## 2018-08-17 NOTE — ED ADULT NURSE REASSESSMENT NOTE - NS ED NURSE REASSESS COMMENT FT1
received report from rn dionte. patient asleep easily arousable. patient vitals stable. awaiting psych. updated regarding plan of care. verbalized understanding. denies si/hi.

## 2018-08-17 NOTE — ED ADULT NURSE NOTE - CHPI ED NUR SYMPTOMS NEG
no fever/no nausea/no decreased eating/drinking/no dizziness/no pain/no weakness/no chills/no vomiting/no tingling

## 2018-08-17 NOTE — ED ADULT NURSE NOTE - NSIMPLEMENTINTERV_GEN_ALL_ED
Implemented All Universal Safety Interventions:  [place] to call system. Call bell, personal items and telephone within reach. Instruct patient to call for assistance. Room bathroom lighting operational. Non-slip footwear when patient is off stretcher. Physically safe environment: no spills, clutter or unnecessary equipment. Stretcher in lowest position, wheels locked, appropriate side rails in place.

## 2018-08-17 NOTE — ED ADULT NURSE REASSESSMENT NOTE - NS ED NURSE REASSESS COMMENT FT1
pt awake alert and oriented x3. resp even and unlabored. denies pain. awaiting psych transfer; agreeable to inpatient care.

## 2018-08-18 NOTE — ED BEHAVIORAL HEALTH NOTE - BEHAVIORAL HEALTH NOTE
SWNote : worker called pt's insurance [facility] [employer]  [phone] spoke with care advocate Selma DENNY Clinicals presented ,auth #[id number] for 3 days (8/17,18 and 19th,2018) . [facility] UR to call on Monday if more days needed at [phone] ext [phone]. E mailed sent.

## 2018-08-20 DIAGNOSIS — Z71.89 OTHER SPECIFIED COUNSELING: ICD-10-CM

## 2018-09-01 ENCOUNTER — OUTPATIENT (OUTPATIENT)
Dept: OUTPATIENT SERVICES | Facility: HOSPITAL | Age: 58
LOS: 1 days | End: 2018-09-01

## 2018-09-01 DIAGNOSIS — Z98.89 OTHER SPECIFIED POSTPROCEDURAL STATES: Chronic | ICD-10-CM

## 2018-09-01 DIAGNOSIS — Z96.652 PRESENCE OF LEFT ARTIFICIAL KNEE JOINT: Chronic | ICD-10-CM

## 2018-10-02 ENCOUNTER — EMERGENCY (EMERGENCY)
Facility: HOSPITAL | Age: 58
LOS: 1 days | Discharge: LEFT BEFORE DISCHARGE | End: 2018-10-02

## 2018-10-02 VITALS
HEART RATE: 112 BPM | RESPIRATION RATE: 18 BRPM | TEMPERATURE: 99 F | OXYGEN SATURATION: 96 % | SYSTOLIC BLOOD PRESSURE: 150 MMHG | DIASTOLIC BLOOD PRESSURE: 85 MMHG

## 2018-10-02 VITALS — WEIGHT: 240.08 LBS

## 2018-10-02 DIAGNOSIS — Z98.89 OTHER SPECIFIED POSTPROCEDURAL STATES: Chronic | ICD-10-CM

## 2018-10-02 DIAGNOSIS — Z96.652 PRESENCE OF LEFT ARTIFICIAL KNEE JOINT: Chronic | ICD-10-CM

## 2019-01-05 ENCOUNTER — EMERGENCY (EMERGENCY)
Facility: HOSPITAL | Age: 59
LOS: 1 days | Discharge: LEFT BEFORE DISCHARGE | End: 2019-01-05

## 2019-01-05 VITALS
RESPIRATION RATE: 18 BRPM | SYSTOLIC BLOOD PRESSURE: 161 MMHG | HEART RATE: 74 BPM | WEIGHT: 229.94 LBS | OXYGEN SATURATION: 96 % | DIASTOLIC BLOOD PRESSURE: 83 MMHG | HEIGHT: 72 IN | TEMPERATURE: 98 F

## 2019-01-05 DIAGNOSIS — Z98.89 OTHER SPECIFIED POSTPROCEDURAL STATES: Chronic | ICD-10-CM

## 2019-01-05 DIAGNOSIS — Z96.652 PRESENCE OF LEFT ARTIFICIAL KNEE JOINT: Chronic | ICD-10-CM

## 2019-07-11 ENCOUNTER — EMERGENCY (EMERGENCY)
Facility: HOSPITAL | Age: 59
LOS: 1 days | Discharge: HOME | End: 2019-07-11
Attending: EMERGENCY MEDICINE
Payer: MEDICAID

## 2019-07-11 VITALS
HEART RATE: 89 BPM | OXYGEN SATURATION: 96 % | DIASTOLIC BLOOD PRESSURE: 91 MMHG | RESPIRATION RATE: 20 BRPM | SYSTOLIC BLOOD PRESSURE: 150 MMHG | WEIGHT: 240.08 LBS | HEIGHT: 72 IN | TEMPERATURE: 99 F

## 2019-07-11 DIAGNOSIS — Z98.89 OTHER SPECIFIED POSTPROCEDURAL STATES: Chronic | ICD-10-CM

## 2019-07-11 DIAGNOSIS — Z96.652 PRESENCE OF LEFT ARTIFICIAL KNEE JOINT: Chronic | ICD-10-CM

## 2019-07-11 PROCEDURE — 99284 EMERGENCY DEPT VISIT MOD MDM: CPT | Mod: 25

## 2019-07-11 PROCEDURE — 93010 ELECTROCARDIOGRAM REPORT: CPT

## 2019-07-11 PROCEDURE — 93005 ELECTROCARDIOGRAM TRACING: CPT

## 2019-07-11 PROCEDURE — 99283 EMERGENCY DEPT VISIT LOW MDM: CPT

## 2019-07-11 RX ORDER — SODIUM CHLORIDE 9 MG/ML
1000 INJECTION INTRAMUSCULAR; INTRAVENOUS; SUBCUTANEOUS ONCE
Refills: 0 | Status: DISCONTINUED | OUTPATIENT
Start: 2019-07-11 | End: 2019-07-16

## 2019-07-11 NOTE — ED ADULT TRIAGE NOTE - CHIEF COMPLAINT QUOTE
Pt c/o chest pains starting this morning, denies n/v/radiation of pain, states pain is better upon ED arrival, hx of HTN but does not take any medications, as per EMS pt has not drank any water in 2 days

## 2019-07-11 NOTE — ED PROVIDER NOTE - OBJECTIVE STATEMENT
Pt is a 59 y/o male with h/o HTN presented with waxing and waning sharp non-radiating CP onset this morning. Pt states that the pain is not associated with eating, drinking or exertion. Pt admits that he hasn't drunk lots of fluids in the past few days and his urine seems dark. Pt admits that he stopped taking BP meds about one year ago because it makes him feel bad. Pt had a stress echo performed about two years ago and it was normal. Pt drinks 1-2 beers daily and smokes half pack of cigarettes for 40 years, smokes cannabis occasionally. Pt states that he used illicit drugs when he was younger but denies any use at present. Pt denies diaphoresis, HA, dizziness, LOC, neck pain, SOB, abd pain, N/V/D, back pain, fever, chills, cough, dysuria, hematuria, or any other sx's. No sudden cardiac death in the family.

## 2019-07-11 NOTE — ED PROVIDER NOTE - CHPI ED SYMPTOMS NEG
no shortness of breath/no vomiting/no diaphoresis/no back pain/no cough/no dizziness/no fever/no chills/no nausea/no syncope

## 2019-08-14 DIAGNOSIS — Z71.89 OTHER SPECIFIED COUNSELING: ICD-10-CM

## 2019-09-10 ENCOUNTER — EMERGENCY (EMERGENCY)
Facility: HOSPITAL | Age: 59
LOS: 1 days | Discharge: LEFT BEFORE DISCHARGE | End: 2019-09-10

## 2019-09-10 DIAGNOSIS — Z98.89 OTHER SPECIFIED POSTPROCEDURAL STATES: Chronic | ICD-10-CM

## 2019-09-10 DIAGNOSIS — Z96.652 PRESENCE OF LEFT ARTIFICIAL KNEE JOINT: Chronic | ICD-10-CM

## 2020-01-16 ENCOUNTER — EMERGENCY (EMERGENCY)
Facility: HOSPITAL | Age: 60
LOS: 1 days | Discharge: HOME | End: 2020-01-16
Attending: EMERGENCY MEDICINE
Payer: MEDICAID

## 2020-01-16 VITALS
SYSTOLIC BLOOD PRESSURE: 170 MMHG | DIASTOLIC BLOOD PRESSURE: 94 MMHG | OXYGEN SATURATION: 98 % | TEMPERATURE: 99 F | HEART RATE: 72 BPM | RESPIRATION RATE: 18 BRPM

## 2020-01-16 VITALS
SYSTOLIC BLOOD PRESSURE: 180 MMHG | OXYGEN SATURATION: 95 % | HEART RATE: 97 BPM | HEIGHT: 74 IN | RESPIRATION RATE: 18 BRPM | DIASTOLIC BLOOD PRESSURE: 103 MMHG | WEIGHT: 240.08 LBS | TEMPERATURE: 98 F

## 2020-01-16 DIAGNOSIS — Z98.89 OTHER SPECIFIED POSTPROCEDURAL STATES: Chronic | ICD-10-CM

## 2020-01-16 DIAGNOSIS — Z96.652 PRESENCE OF LEFT ARTIFICIAL KNEE JOINT: Chronic | ICD-10-CM

## 2020-01-16 PROCEDURE — 93005 ELECTROCARDIOGRAM TRACING: CPT

## 2020-01-16 PROCEDURE — 99285 EMERGENCY DEPT VISIT HI MDM: CPT | Mod: 25

## 2020-01-16 PROCEDURE — 81003 URINALYSIS AUTO W/O SCOPE: CPT

## 2020-01-16 PROCEDURE — 80307 DRUG TEST PRSMV CHEM ANLYZR: CPT

## 2020-01-16 PROCEDURE — 99283 EMERGENCY DEPT VISIT LOW MDM: CPT

## 2020-01-16 PROCEDURE — 93010 ELECTROCARDIOGRAM REPORT: CPT

## 2020-01-16 PROCEDURE — 90792 PSYCH DIAG EVAL W/MED SRVCS: CPT

## 2020-01-16 RX ORDER — OLANZAPINE 15 MG/1
1 TABLET, FILM COATED ORAL
Qty: 15 | Refills: 1
Start: 2020-01-16 | End: 2020-02-14
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
## 2020-01-16 NOTE — ED BEHAVIORAL HEALTH ASSESSMENT NOTE - RISK ASSESSMENT
Patient denies any S/H I/I/P, h/o suicide attempt in distant past, reports suspiciousness about neighbors behavior in context of ETOH use, med non compliance and possible cocaine use, no currente auditory/visual hallucinations, no reported aggression, does not appear to be responding to internal stimuli, patient reports willingness to restart medication and re engage in outpatient tx, patient does have h/o seeking help when symptomatic, Patient's h/o substance use, intermittent non compliance increase long term risk. Low Acute Suicide Risk

## 2020-01-16 NOTE — ED BEHAVIORAL HEALTH ASSESSMENT NOTE - MEDICATIONS (PRESCRIPTIONS, DIRECTIONS)
patient reports he will restart medications and has supply at home patient reports he will restart medications and has supply at home. Will also send Olanzapine 10 mg at night #15 refills 1

## 2020-01-16 NOTE — ED BEHAVIORAL HEALTH ASSESSMENT NOTE - SUMMARY
Patient is a 59  year old, male; lives in a rented room; single; noncaregiver; unemployed;  with past psychiatric history of possible psychotic spectrum illness, reported "Bipolar disorder", complicated by h/o cocaine use; with multiple prior psychiatric  hospitalizations  (reports last at [facility] months ago) for depression or psychotic sx's often in context of cocaine use (upon review or prior record), with one reported suicide attempt > 20 years bu cutting, denies any acitve legal probles;   ; w/ PMH of HTN; brought in by police after patient called reporting suspicious activities by neighbors.  No reported agitation or aggression by patient.  He admits to stopping medications 1 week and 1/2 ago and chart was closed at [facility] on 1/6/20.  Admits that he called police in context of ETOH use, suspect possible cocaine use.  Currently no evidence of intoxication or withdrawal symptoms.  Denies any S/H I/I/P. Does report some vague paranoia  towards neighbors likely substance induced, but denies that he feels any danger to himself and does feel safe returning home.  Appears to be at baseline with resolution of symptoms.  Patient does not meet criteria for involuntary psychiatric hospitalization.  He reports willingness to restart medications, maintain sobriety and return to outpatient tx. Patient is a 59  year old, male; lives in a rented room; single; noncaregiver; unemployed;  with past psychiatric history of possible psychotic spectrum illness, reported "Bipolar disorder", complicated by h/o cocaine use; with multiple prior psychiatric  hospitalizations  (reports last at [facility] months ago) for depression or psychotic sx's often in context of cocaine use (upon review or prior record), with one reported suicide attempt > 20 years bu cutting, denies any acitve legal probles;   ; w/ PMH of HTN; brought in by police after patient called reporting suspicious activities by neighbors.  No reported agitation or aggression by patient.  He admits to stopping medications 1 week and 1/2 ago [facility]  Admits that he called police in context of ETOH use, suspect possible cocaine use.  Currently no evidence of intoxication or withdrawal symptoms.  Denies any S/H I/I/P. Does report some vague paranoia  towards neighbors likely substance induced, but denies that he feels any danger to himself and does feel safe returning home.  Appears to be at baseline with resolution of symptoms.  Patient does not meet criteria for involuntary psychiatric hospitalization.  He reports willingness to restart medications, maintain sobriety and return to outpatient tx. Also discussed starting Olanzapine which has been helpful in the past.

## 2020-01-16 NOTE — ED BEHAVIORAL HEALTH ASSESSMENT NOTE - HPI (INCLUDE ILLNESS QUALITY, SEVERITY, DURATION, TIMING, CONTEXT, MODIFYING FACTORS, ASSOCIATED SIGNS AND SYMPTOMS)
Patient is a 59  year old, male; lives in a rented room; single; noncaregiver; unemployed;  with past psychiatric history of possible psychotic spectrum illness, reported "Bipolar disorder", complicated by h/o cocaine use; with multiple prior psychiatric  hospitalizations  (reports last at [facility] months ago) for depression or psychotic sx's often in context of cocaine use (upon review or prior record), with one reported suicide attempt > 20 years bu cutting, denies any acitve legal probles;   ; w/ PMH of HTN; brought in by police after patient called reporting suspicious activities by neighbors.       Patient reports that he had been following up at [facility] but stopped going last month. States he has not taken his medications in the last week 1/2.  Writer called pharmacy [facility] and verified that patient picked up Lamictal 25 mg daily (2 tabs daily) on December 6th.  Patient reports that last night he drank some alcohol.  She denies any cocaine use but may be minimizing.   He reports that he was seeing people come in and out of neighbors house and was concerned they were involved in illegal activities.  HE called 911 and he reports police went to neighbors but brought him in to hospital instead. There is no report of any violence or aggression.         Patient denies that concerns about his safety.  He denies that he is afraid or denies any aggressive feelings towards his neighbors. He is upset with being brought to the hospital.  He concedes that he may have been incorrect even though he doesn't think so.  He denies any other recent stressors.  He is denying any substance use.  He denies feeling depressed or anhedonic. He reports that if he doesn't feel well he seeks help like he has multiple times in the past.     Concerning other psychiatric symptoms, pt denies any suicidal ideation, intent or plan as well as any aggressive or violent behavior towards others. Pt denies any current symptoms of bizarre happiness, unusual energy, unusual nightime excitation or other common symptoms of jayden. Pt denies hearing voices or seeing things.   Patient denies pervasive anxiety,  panic attacks, obsessions or compulsions.       Patient is in agreement to re start his medications and to return to outpatient tx.  Writer called [facility] who reported that patient chart was closed there on 1/6. Attempted to obtain collateral and left message for supervisor.  Patient is known to nursing staff and appears currently at baseline.   Patient does not feel that he needs to an in patient hospital and feels safe with discharge back home. Patient is a 59  year old, male; lives in a rented room; single; noncaregiver; unemployed;  with past psychiatric history of possible psychotic spectrum illness, reported "Bipolar disorder", complicated by h/o cocaine use; with multiple prior psychiatric  hospitalizations  (reports last at [facility] months ago) for depression or psychotic sx's often in context of cocaine use (upon review or prior record), with one reported suicide attempt > 20 years bu cutting, denies any acitve legal probles;   ; w/ PMH of HTN; brought in by police after patient called reporting suspicious activities by neighbors.       Patient reports that he had been following up at [facility] but stopped going last month. States he has not taken his medications in the last week 1/2.  Writer called pharmacy [facility] and verified that patient picked up Lamictal 25 mg daily (2 tabs daily) on December 6th.  Patient reports that last night he drank some alcohol.  She denies any cocaine use but may be minimizing.   He reports that he was seeing people come in and out of neighbors house and was concerned they were involved in illegal activities.  HE called 911 and he reports police went to neighbors but brought him in to hospital instead. There is no report of any violence or aggression.         Patient denies that concerns about his safety.  He denies that he is afraid or denies any aggressive feelings towards his neighbors. He is upset with being brought to the hospital.  He concedes that he may have been incorrect even though he doesn't think so.  He denies any other recent stressors.  He is denying any substance use.  He denies feeling depressed or anhedonic. He reports that if he doesn't feel well he seeks help like he has multiple times in the past.  He does report poor sleep last night.  Discussed tx options. Patient reports willingness to continue current medications and restart Zyprexa which has helped in the past.     Concerning other psychiatric symptoms, pt denies any suicidal ideation, intent or plan as well as any aggressive or violent behavior towards others. Pt denies any current symptoms of bizarre happiness, unusual energy, unusual nightime excitation or other common symptoms of jayden. Pt denies hearing voices or seeing things.   Patient denies pervasive anxiety,  panic attacks, obsessions or compulsions.       Patient is in agreement to re start his medications and to return to outpatient tx and to start Olanzapine 10 mg at night.   Writer called [facility] who reported that patient chart was closed there on 1/6. Attempted to obtain collateral and left message for supervisor.  Patient is known to nursing staff and appears currently at baseline.   Patient does not feel that he needs to an in patient hospital and feels safe with discharge back home.

## 2020-01-16 NOTE — ED ADULT NURSE NOTE - CHIEF COMPLAINT QUOTE
patient was brought in by [facility] badge #[id number] for Bizzare Behaviour, Informed that patient has been texting that people were going to kill him, and that there is sex trafficking coming out of his house. Upon arrival Patient did admit to be on psychiatric meds for Bi-Polar that he hasn't been taking. patient did admit to Drinking.  Patient Denies SI/HI, MD Barragan called to evaluate.

## 2020-01-16 NOTE — ED BEHAVIORAL HEALTH ASSESSMENT NOTE - DESCRIPTION
Patient was not aggressive or agitated. He did not appear to be responding to internal stimuli. He was mildly irritable with being brought to the hospital , and felt he should have kept his "Mouth shut". He is in agreement to restart tx and with outpatient referral.  Patient consistently denies any suicidal thoughts or any aggressive feelings towards others.      Vital Signs Last 24 Hrs  T(C): 37.1 (16 Jan 2020 11:05), Max: 37.1 (16 Jan 2020 11:05)  T(F): 98.7 (16 Jan 2020 11:05), Max: 98.7 (16 Jan 2020 11:05)  HR: 76 (16 Jan 2020 11:05) (76 - 97)  BP: 178/98 (16 Jan 2020 11:05) (178/98 - 184/105)  BP(mean): --  RR: 18 (16 Jan 2020 11:05) (18 - 20)  SpO2: 96% (16 Jan 2020 11:05) (95% - 98%) right TKA  April 2016 domiciled in basement apartment alone, , has adult daughter

## 2020-01-16 NOTE — ED PROVIDER NOTE - PATIENT PORTAL LINK FT
You can access the FollowMyHealth Patient Portal offered by [facility] by registering at the following website: [email or web address]. By joining [facility]’s FollowMyHealth portal, you will also be able to view your health information using other applications (apps) compatible with our system.

## 2020-01-16 NOTE — ED PROVIDER NOTE - CLINICAL SUMMARY MEDICAL DECISION MAKING FREE TEXT BOX
Pt. with possible paranoia. Pt. also with elevated BP. BP is elevated due to non-compliance. Pt. has no chest pain. NO headache. NO SOB. Pt. to be seen by psych.

## 2020-01-16 NOTE — ED BEHAVIORAL HEALTH ASSESSMENT NOTE - DESCRIPTION (FIRST USE, LAST USE, QUANTITY, FREQUENCY, DURATION)
current cigarette use, 1 pack per day denies history of abuse, reports last drank 2 drinks yesterday, may be minimizing h/o use, denies any recent use, however may be minimizing,

## 2020-01-16 NOTE — ED PROVIDER NOTE - PROGRESS NOTE DETAILS
Addendum to chart: Pt. was seen and cleared by psych. Pt's BP improving. pt. was had asymptomatic HTN. I recommended that pt. take his medication and that he follows up with his PMD.
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
## 2020-01-16 NOTE — CHART NOTE - NSCHARTNOTEFT_GEN_A_CORE
J CARLOS Note: Pt has been cleared for discharge by ED and  providers. Pt will be returning home. Needs a referral for o/p tx. Met with pt. Discussed referral to [facility]. pt accepted an intake appt for 1/23 @ 3:30pm. Appt card and brochure given. HIPPA consent form signed. Also provided info on the Counts include 234 beds at the [facility] center and mobile crisis team

## 2020-01-16 NOTE — ED BEHAVIORAL HEALTH ASSESSMENT NOTE - NS ED BHA CANNABIS
8 month old male born at 40 weeks vaginally no extended NICU stay presents with left eyebrow laceration s/p hitting his head on an activity wooden block; denies LOC, n/v, decreased activity level; tolerated PO since the injury. Mom states she went to pediatrician, who stated the laceration is 'borderline', suggested getting it checked out by Dr. Waterman in ER
None known

## 2020-01-16 NOTE — ED ADULT NURSE NOTE - OBJECTIVE STATEMENT
Patient presented in  area dressed in casual attire.  Patient reports he is upset because he was brought to ED after he called police to report what he thought was suspicious behavior like sex trafficking of neighbors. Patient reports police went to the house of the people he was concerned about as opposed to investigating or monitoring them.  Patient upset he was brought to ED which he claims is due to past history of bipolar.  Patient admits he was drinking ETOH earlier today but denies drug use.  Patient has a history of cocaine abuse.  Patient denies suicidal or homicidal ideation.  Patient denies auditory of visual hallucinations.  Patient initially upset with  assessment process requesting to talk to the doctor and be discharged.   Cooperated with security contraband assessment and securing belongings in  locker.

## 2020-01-17 LAB
AMPHET UR-MCNC: NEGATIVE — SIGNIFICANT CHANGE UP
APPEARANCE UR: CLEAR — SIGNIFICANT CHANGE UP
BARBITURATES UR SCN-MCNC: NEGATIVE — SIGNIFICANT CHANGE UP
BENZODIAZ UR-MCNC: NEGATIVE — SIGNIFICANT CHANGE UP
BILIRUB UR-MCNC: NEGATIVE — SIGNIFICANT CHANGE UP
COCAINE METAB.OTHER UR-MCNC: NEGATIVE — SIGNIFICANT CHANGE UP
COLOR SPEC: YELLOW — SIGNIFICANT CHANGE UP
DIFF PNL FLD: NEGATIVE — SIGNIFICANT CHANGE UP
GLUCOSE UR QL: NEGATIVE MG/DL — SIGNIFICANT CHANGE UP
KETONES UR-MCNC: NEGATIVE — SIGNIFICANT CHANGE UP
LEUKOCYTE ESTERASE UR-ACNC: NEGATIVE — SIGNIFICANT CHANGE UP
METHADONE UR-MCNC: NEGATIVE — SIGNIFICANT CHANGE UP
NITRITE UR-MCNC: NEGATIVE — SIGNIFICANT CHANGE UP
OPIATES UR-MCNC: NEGATIVE — SIGNIFICANT CHANGE UP
PCP SPEC-MCNC: SIGNIFICANT CHANGE UP
PCP UR-MCNC: NEGATIVE — SIGNIFICANT CHANGE UP
PH UR: 7 — SIGNIFICANT CHANGE UP (ref 5–8)
PROT UR-MCNC: NEGATIVE MG/DL — SIGNIFICANT CHANGE UP
SP GR SPEC: 1 — LOW (ref 1.01–1.02)
THC UR QL: NEGATIVE — SIGNIFICANT CHANGE UP
UROBILINOGEN FLD QL: NEGATIVE MG/DL — SIGNIFICANT CHANGE UP

## 2020-03-03 ENCOUNTER — EMERGENCY (EMERGENCY)
Facility: HOSPITAL | Age: 60
LOS: 1 days | Discharge: LEFT BEFORE DISCHARGE | End: 2020-03-03
Payer: MEDICAID

## 2020-03-03 VITALS
HEART RATE: 81 BPM | WEIGHT: 240.08 LBS | HEIGHT: 72 IN | RESPIRATION RATE: 18 BRPM | OXYGEN SATURATION: 97 % | SYSTOLIC BLOOD PRESSURE: 149 MMHG | DIASTOLIC BLOOD PRESSURE: 82 MMHG | TEMPERATURE: 98 F

## 2020-03-03 DIAGNOSIS — Z98.89 OTHER SPECIFIED POSTPROCEDURAL STATES: Chronic | ICD-10-CM

## 2020-03-03 DIAGNOSIS — Z96.652 PRESENCE OF LEFT ARTIFICIAL KNEE JOINT: Chronic | ICD-10-CM

## 2020-03-03 PROCEDURE — L9991: CPT

## 2020-05-29 NOTE — ED ADULT NURSE NOTE - CADM POA URETHRAL CATHETER
Pharmacy stated that it was an automated service and they do not have diabetic supplies in their chart either.  Nothing further is needed at this time.    No

## 2020-10-14 NOTE — ED BEHAVIORAL HEALTH ASSESSMENT NOTE - TIME CONSULT PERFORMED
Gastroenterology Consult      Consults  Chief Complaint GERD    History Of Present Illness  Eboni is a 42 year old female presenting with chronic acid reflux.  She denies dysphasia, hematemesis or weight loss..    Past Medical History  History reviewed. No pertinent past medical history.     Surgical History  Past Surgical History:   Procedure Laterality Date   •  section, classic          Social History  Social History     Tobacco Use   • Smoking status: Never Smoker   • Smokeless tobacco: Never Used   Substance Use Topics   • Alcohol use: Never     Frequency: Never   • Drug use: Never       Family History  History reviewed. No pertinent family history.     Allergies  ALLERGIES:  Patient has no allergy information on record.    Medications  (Not in a hospital admission)      Review of Systems  Review of Systems   Constitutional: Negative.    HENT: Negative.    Eyes: Negative.    Respiratory: Negative.    Cardiovascular: Negative.    Gastrointestinal: Negative.    Musculoskeletal: Negative.    Skin: Negative.    Neurological: Negative.    Psychiatric/Behavioral: Negative.         Physical Exam  Physical Exam  Vitals signs reviewed.   Constitutional:       Appearance: Normal appearance.   HENT:      Head: Normocephalic and atraumatic.      Right Ear: External ear normal.      Left Ear: External ear normal.      Nose: Nose normal.      Mouth/Throat:      Mouth: Mucous membranes are moist.   Eyes:      Extraocular Movements: Extraocular movements intact.      Conjunctiva/sclera: Conjunctivae normal.      Pupils: Pupils are equal, round, and reactive to light.   Neck:      Musculoskeletal: Normal range of motion and neck supple.   Cardiovascular:      Rate and Rhythm: Normal rate and regular rhythm.      Pulses: Normal pulses.   Pulmonary:      Effort: Pulmonary effort is normal.      Breath sounds: Normal breath sounds.   Abdominal:      General: Abdomen is flat. Bowel sounds are normal.      Palpations:  Abdomen is soft.   Musculoskeletal: Normal range of motion.   Skin:     General: Skin is warm and dry.      Capillary Refill: Capillary refill takes 2 to 3 seconds.   Neurological:      General: No focal deficit present.      Mental Status: She is alert and oriented to person, place, and time. Mental status is at baseline.   Psychiatric:         Mood and Affect: Mood normal.         Behavior: Behavior normal.         Thought Content: Thought content normal.         Judgment: Judgment normal.          Last Recorded Vitals  Visit Vitals  /56 (BP Location: LUE - Left upper extremity)   Pulse 73   Temp 98.2 °F (36.8 °C) (Temporal)   Resp 16   Ht 5' 11.06\" (1.805 m)   Wt 84.1 kg (185 lb 6.5 oz)   LMP 10/14/2020   SpO2 100%   BMI 25.81 kg/m²       Labs  Hospital Outpatient Visit on 10/14/2020   Component Date Value Ref Range Status   • URINE PREGNANCY,QUAL 10/14/2020 Negative  Negative Final    LOT [id number] Exp 2022-01-22 Verified with BENJA Manriquez RN   • Internal Procedural Controls Accep* 10/14/2020 No   Final       Imaging  No results found.    Assessment  42-year-old female with chronic acid reflux  1.  GERD    PLAN  Upper endoscopy    Fabien Levin MD  [facility] available  10/14/2020           17-Aug-2018 11:44

## 2021-08-12 ENCOUNTER — APPOINTMENT (OUTPATIENT)
Dept: ORTHOPEDIC SURGERY | Facility: CLINIC | Age: 61
End: 2021-08-12
Payer: MEDICAID

## 2021-08-12 VITALS
HEIGHT: 72 IN | WEIGHT: 225 LBS | SYSTOLIC BLOOD PRESSURE: 149 MMHG | BODY MASS INDEX: 30.48 KG/M2 | HEART RATE: 66 BPM | DIASTOLIC BLOOD PRESSURE: 90 MMHG

## 2021-08-12 DIAGNOSIS — Z96.651 PRESENCE OF RIGHT ARTIFICIAL KNEE JOINT: ICD-10-CM

## 2021-08-12 PROCEDURE — 73562 X-RAY EXAM OF KNEE 3: CPT | Mod: RT

## 2021-08-12 PROCEDURE — 99203 OFFICE O/P NEW LOW 30 MIN: CPT

## 2021-08-12 RX ORDER — MELOXICAM 7.5 MG/1
7.5 TABLET ORAL
Qty: 60 | Refills: 0 | Status: ACTIVE | COMMUNITY
Start: 2021-08-12 | End: 1900-01-01

## 2021-08-12 NOTE — CONSULT LETTER
[Dear  ___] : Dear  [unfilled], [Consult Letter:] : I had the pleasure of evaluating your patient, [unfilled]. [Please see my note below.] : Please see my note below. [Consult Closing:] : Thank you very much for allowing me to participate in the care of this patient.  If you have any questions, please do not hesitate to contact me. [Sincerely,] : Sincerely, [FreeTextEntry2] : ALLEY JAMES MD\par  [FreeTextEntry3] : Surinder Dorsey MD\par Chief of Joint Replacement\par [facility] Knee [facility]placement \par [facility] [place]\par \par

## 2021-08-12 NOTE — PHYSICAL EXAM
[de-identified] : The patient appears well nourished  and in no apparent distress.  The patient is alert and oriented to person, place, and time.   Affect and mood appear normal. The head is normocephalic and atraumatic.  The eyes reveal normal sclera and extra ocular muscles are intact. The tongue is midline with no apparent lesions.  Skin shows normal turgor with no evidence of eczema or psoriasis.  No respiratory distress noted.  Sensation grossly intact.\par   [de-identified] : Exam of the right knee shows no varus/valgus instability, -3 to 116 degrees of flexion with pain measured with a goniometer. There is a small effusion. There is no erythema or warmth. No instability with flexion varus/valgus or anterior/posterior drawer. 5/5 motor strength bilaterally distally. Sensation intact distally.  [de-identified] : X-ray: 3 views of the right knee demonstrate a total knee replacement in good alignment with a well centered patella, without evidence of loosening or subsidence, and no fracture.

## 2021-08-12 NOTE — HISTORY OF PRESENT ILLNESS
[de-identified] : Mr. LIS SILVESTRE is a 60 year old male presenting for evaluation of 2-3 months of right knee pain, status post right TKR in April of 2016.  Patient states he was doing very well up until 2 to 3 months ago when his pain developed.  He denies any falls, trauma, or increase in activity.  Patient denies any fevers or chills.  He admits to intermittent swelling of the knee.  His pain is localized to the posterior and lateral aspect of the right knee.  Patient has tried taking Tylenol as well as Advil without significant relief.  He went and saw his primary doctor before seeing us, and the primary doctor prescribed Percocet.  He has not had recent physical therapy.

## 2021-08-12 NOTE — REASON FOR VISIT
[Initial Visit] : an initial visit for [Other: ____] : [unfilled] [FreeTextEntry2] : S/P Right cruciate-retaining TKR. DOS 04/22/16\par

## 2021-08-12 NOTE — DISCUSSION/SUMMARY
[de-identified] : The patient is a 60 year old male who presents with swelling and pain s/p total knee replacement done 4/22/16. Patient denies fever or chills, and there is no warmth or redness on exam of the knee today. ESR and CRP testing was ordered to rule out infection although my suspicion is low. I recommended a course of Mobic. The patient was given a prescription for the Mobic with directions. He was instructed to stop the medicine and call the office if there are any adverse reaction to the medicine. He was also instructed to consult with their primary care doctor prior to starting the medication. Follow up after labs are complete and if elevated will need aspiration performed.

## 2021-08-12 NOTE — ADDENDUM
[FreeTextEntry1] : This note was authored by Aby Villasenor working as a medical scribe for Dr. Surinder Dorsey. The note was reviewed, edited, and revised by Dr. Surinder Dorsey whom is in agreement with the exam findings, imaging findings, and treatment plan. 08/12/2021

## 2021-10-01 ENCOUNTER — OUTPATIENT (OUTPATIENT)
Dept: OUTPATIENT SERVICES | Facility: HOSPITAL | Age: 61
LOS: 1 days | End: 2021-10-01
Payer: MEDICAID

## 2021-10-01 DIAGNOSIS — Z96.652 PRESENCE OF LEFT ARTIFICIAL KNEE JOINT: Chronic | ICD-10-CM

## 2021-10-01 DIAGNOSIS — Z98.89 OTHER SPECIFIED POSTPROCEDURAL STATES: Chronic | ICD-10-CM

## 2021-10-07 ENCOUNTER — EMERGENCY (EMERGENCY)
Facility: HOSPITAL | Age: 61
LOS: 1 days | Discharge: HOME | End: 2021-10-07
Attending: EMERGENCY MEDICINE
Payer: MEDICAID

## 2021-10-07 VITALS
HEIGHT: 72 IN | TEMPERATURE: 98 F | OXYGEN SATURATION: 97 % | SYSTOLIC BLOOD PRESSURE: 182 MMHG | WEIGHT: 250 LBS | DIASTOLIC BLOOD PRESSURE: 107 MMHG | RESPIRATION RATE: 20 BRPM | HEART RATE: 90 BPM

## 2021-10-07 DIAGNOSIS — Z96.652 PRESENCE OF LEFT ARTIFICIAL KNEE JOINT: Chronic | ICD-10-CM

## 2021-10-07 DIAGNOSIS — F25.9 SCHIZOAFFECTIVE DISORDER, UNSPECIFIED: ICD-10-CM

## 2021-10-07 DIAGNOSIS — Z98.89 OTHER SPECIFIED POSTPROCEDURAL STATES: Chronic | ICD-10-CM

## 2021-10-07 LAB
AMPHET UR-MCNC: NEGATIVE — SIGNIFICANT CHANGE UP
ANION GAP SERPL CALC-SCNC: 17 MMOL/L — SIGNIFICANT CHANGE UP (ref 5–17)
APAP SERPL-MCNC: <3 UG/ML — LOW (ref 10–26)
APPEARANCE UR: ABNORMAL
BACTERIA # UR AUTO: ABNORMAL
BARBITURATES UR SCN-MCNC: NEGATIVE — SIGNIFICANT CHANGE UP
BASOPHILS # BLD AUTO: 0.03 K/UL — SIGNIFICANT CHANGE UP (ref 0–0.2)
BASOPHILS NFR BLD AUTO: 0.4 % — SIGNIFICANT CHANGE UP (ref 0–2)
BENZODIAZ UR-MCNC: NEGATIVE — SIGNIFICANT CHANGE UP
BILIRUB UR-MCNC: NEGATIVE — SIGNIFICANT CHANGE UP
BUN SERPL-MCNC: 10.3 MG/DL — SIGNIFICANT CHANGE UP (ref 8–20)
CALCIUM SERPL-MCNC: 9.3 MG/DL — SIGNIFICANT CHANGE UP (ref 8.6–10.2)
CHLORIDE SERPL-SCNC: 106 MMOL/L — SIGNIFICANT CHANGE UP (ref 98–107)
CO2 SERPL-SCNC: 19 MMOL/L — LOW (ref 22–29)
COCAINE METAB.OTHER UR-MCNC: POSITIVE
COLOR SPEC: YELLOW — SIGNIFICANT CHANGE UP
CREAT SERPL-MCNC: 0.9 MG/DL — SIGNIFICANT CHANGE UP (ref 0.5–1.3)
DIFF PNL FLD: NEGATIVE — SIGNIFICANT CHANGE UP
EOSINOPHIL # BLD AUTO: 0.09 K/UL — SIGNIFICANT CHANGE UP (ref 0–0.5)
EOSINOPHIL NFR BLD AUTO: 1.3 % — SIGNIFICANT CHANGE UP (ref 0–6)
EPI CELLS # UR: SIGNIFICANT CHANGE UP
ETHANOL SERPL-MCNC: <10 MG/DL — SIGNIFICANT CHANGE UP (ref 0–9)
GLUCOSE SERPL-MCNC: 91 MG/DL — SIGNIFICANT CHANGE UP (ref 70–99)
GLUCOSE UR QL: 1000 MG/DL
HCT VFR BLD CALC: 40.3 % — SIGNIFICANT CHANGE UP (ref 39–50)
HGB BLD-MCNC: 12.9 G/DL — LOW (ref 13–17)
IMM GRANULOCYTES NFR BLD AUTO: 0.3 % — SIGNIFICANT CHANGE UP (ref 0–1.5)
KETONES UR-MCNC: ABNORMAL
LEUKOCYTE ESTERASE UR-ACNC: NEGATIVE — SIGNIFICANT CHANGE UP
LYMPHOCYTES # BLD AUTO: 1.52 K/UL — SIGNIFICANT CHANGE UP (ref 1–3.3)
LYMPHOCYTES # BLD AUTO: 21.3 % — SIGNIFICANT CHANGE UP (ref 13–44)
MCHC RBC-ENTMCNC: 28.6 PG — SIGNIFICANT CHANGE UP (ref 27–34)
MCHC RBC-ENTMCNC: 32 GM/DL — SIGNIFICANT CHANGE UP (ref 32–36)
MCV RBC AUTO: 89.4 FL — SIGNIFICANT CHANGE UP (ref 80–100)
METHADONE UR-MCNC: NEGATIVE — SIGNIFICANT CHANGE UP
MONOCYTES # BLD AUTO: 0.75 K/UL — SIGNIFICANT CHANGE UP (ref 0–0.9)
MONOCYTES NFR BLD AUTO: 10.5 % — SIGNIFICANT CHANGE UP (ref 2–14)
NEUTROPHILS # BLD AUTO: 4.73 K/UL — SIGNIFICANT CHANGE UP (ref 1.8–7.4)
NEUTROPHILS NFR BLD AUTO: 66.2 % — SIGNIFICANT CHANGE UP (ref 43–77)
NITRITE UR-MCNC: NEGATIVE — SIGNIFICANT CHANGE UP
OPIATES UR-MCNC: NEGATIVE — SIGNIFICANT CHANGE UP
PCP SPEC-MCNC: SIGNIFICANT CHANGE UP
PCP SPEC-MCNC: SIGNIFICANT CHANGE UP
PCP UR-MCNC: NEGATIVE — SIGNIFICANT CHANGE UP
PH UR: 5 — SIGNIFICANT CHANGE UP (ref 5–8)
PLATELET # BLD AUTO: 313 K/UL — SIGNIFICANT CHANGE UP (ref 150–400)
POTASSIUM SERPL-MCNC: 3.9 MMOL/L — SIGNIFICANT CHANGE UP (ref 3.5–5.3)
POTASSIUM SERPL-SCNC: 3.9 MMOL/L — SIGNIFICANT CHANGE UP (ref 3.5–5.3)
PROT UR-MCNC: NEGATIVE MG/DL — SIGNIFICANT CHANGE UP
RBC # BLD: 4.51 M/UL — SIGNIFICANT CHANGE UP (ref 4.2–5.8)
RBC # FLD: 12.8 % — SIGNIFICANT CHANGE UP (ref 10.3–14.5)
RBC CASTS # UR COMP ASSIST: SIGNIFICANT CHANGE UP /HPF (ref 0–4)
SALICYLATES SERPL-MCNC: <0.6 MG/DL — LOW (ref 10–20)
SARS-COV-2 RNA SPEC QL NAA+PROBE: SIGNIFICANT CHANGE UP
SODIUM SERPL-SCNC: 142 MMOL/L — SIGNIFICANT CHANGE UP (ref 135–145)
SP GR SPEC: 1.02 — SIGNIFICANT CHANGE UP (ref 1.01–1.02)
THC UR QL: NEGATIVE — SIGNIFICANT CHANGE UP
UROBILINOGEN FLD QL: NEGATIVE MG/DL — SIGNIFICANT CHANGE UP
WBC # BLD: 7.14 K/UL — SIGNIFICANT CHANGE UP (ref 3.8–10.5)
WBC # FLD AUTO: 7.14 K/UL — SIGNIFICANT CHANGE UP (ref 3.8–10.5)
WBC UR QL: NEGATIVE — SIGNIFICANT CHANGE UP

## 2021-10-07 PROCEDURE — 90792 PSYCH DIAG EVAL W/MED SRVCS: CPT

## 2021-10-07 PROCEDURE — 99220: CPT

## 2021-10-07 PROCEDURE — 93010 ELECTROCARDIOGRAM REPORT: CPT

## 2021-10-07 RX ORDER — OLANZAPINE 15 MG/1
1 TABLET, FILM COATED ORAL
Qty: 0 | Refills: 0 | DISCHARGE

## 2021-10-07 RX ORDER — LISINOPRIL 2.5 MG/1
0 TABLET ORAL
Qty: 0 | Refills: 0 | DISCHARGE

## 2021-10-07 RX ORDER — BENZTROPINE MESYLATE 1 MG
2 TABLET ORAL
Qty: 0 | Refills: 0 | DISCHARGE

## 2021-10-07 RX ORDER — HALOPERIDOL DECANOATE 100 MG/ML
5 INJECTION INTRAMUSCULAR EVERY 8 HOURS
Refills: 0 | Status: DISCONTINUED | OUTPATIENT
Start: 2021-10-07 | End: 2021-10-11

## 2021-10-07 RX ORDER — AMLODIPINE BESYLATE 2.5 MG/1
1 TABLET ORAL
Qty: 0 | Refills: 0 | DISCHARGE

## 2021-10-07 RX ADMIN — Medication 1 MILLIGRAM(S): at 17:25

## 2021-10-07 NOTE — ED BEHAVIORAL HEALTH ASSESSMENT NOTE - SUMMARY
Patient has a past psychiatric history of bipolar disorder, depression, schizophrenia, multiple inpatient admissions (last x1yr at [facility]), and multiple suicide attempts (last "a long time ago"). Patient presents to the ED, self referred after being off his medications x3 weeks and hearing voices and seeing things that his girlfriend does not see. Patient states he hears these voices "all the time" but moreso when he is off his medications. Voices are commanding him to hurt himself but denies that they tell him to hurt others. Patient would not elaborate what he sees when experiencing a visual hallucination. Patient was paranoid and guarded on assessment, laughing inappropriately at times. Patient endorses feelings of depression, anxiety, hopelessness, and worthlessness. States he needs help and would like to get back on his medications. Patient would benefit from inpatient admission for his safety and for mood stabilization. Discussed plan with patient who agrees to go inpatient and states he would like to restart his medications and be recommended to outpatient treatment once discharged from inpatient facility.

## 2021-10-07 NOTE — ED PROVIDER NOTE - OBJECTIVE STATEMENT
62yo M pmhx bipolar ds, schizophrenia, HTN, HLD presents to ED c/o visual and auditory hallucinations. Pt unwilling to elaborate on what he is seeing/hearing or whether voices are threatening to him. States he has been off his psychiatric medications for a few months, not currently following with a psychiatrist. States he was trying to deal with it himself at home but his girlfriend urged him to seek psychiatric care. Admits to drinking 3 beers a few hours prior to arrival. Denies drug use. previously on zyprexa, seroquel. Denies SI/HI, cp, sob, headache, dizziness.

## 2021-10-07 NOTE — ED ADULT TRIAGE NOTE - CHIEF COMPLAINT QUOTE
Pt coming into ED for psych eval.  PT reports he has been off all his medication for th past month. Hx of Bipolar and schizophrenia.  +auditory hallucinations.  Denies SI/HI or drug use.  States he had a few beers this evening.

## 2021-10-07 NOTE — ED BEHAVIORAL HEALTH ASSESSMENT NOTE - CURRENT MEDICATION
Olanzapine 10 mg oral tablet: 1 tab(s) orally once a day   Quetiapine 100 mg oral tablet: 3 tab(s) orally once a day (at bedtime)  trazodone 100 mg oral tablet: 1 tab(s) orally once a day (at bedtime)  Cogentin: 2 milligram(s) orally once a day (at bedtime)  Zyprexa 20 mg oral tablet: 1 tab(s) orally once a day  lisinopril:  orally once a day 73.4

## 2021-10-07 NOTE — ED PROVIDER NOTE - NSICDXPASTSURGICALHX_GEN_ALL_CORE_FT
PAST SURGICAL HISTORY:  S/P exploratory laparotomy (2011)    S/P hernia surgery (2014)    S/P sinus surgery     Status post total left knee replacement

## 2021-10-07 NOTE — ED BEHAVIORAL HEALTH ASSESSMENT NOTE - DESCRIPTION
HTN Patient was lethargic but aroused to tactile stimuli. Patient was seen lying comfortably in bed in NAD although he did appear restless and anxious. Patient was paranoid and guarded with the writer but was cooperative during assessment.    Vital Signs Last 24 Hrs  T(C): 36.7 (07 Oct 2021 06:52), Max: 36.7 (07 Oct 2021 03:40)  T(F): 98.1 (07 Oct 2021 06:52), Max: 98.1 (07 Oct 2021 03:40)  HR: 70 (07 Oct 2021 06:52) (70 - 90)  BP: 155/89 (07 Oct 2021 06:52) (155/89 - 182/107)  BP(mean): --  RR: 18 (07 Oct 2021 06:52) (18 - 20)  SpO2: 97% (07 Oct 2021 06:52) (97% - 97%) Lives in a house with his girlfriend and has 4 adult children who no longer live with him. Drinks alcohol socially, denies nicotine use, denies current drug use.

## 2021-10-07 NOTE — ED PROVIDER NOTE - NSICDXPASTMEDICALHX_GEN_ALL_CORE_FT
PAST MEDICAL HISTORY:  Anxiety     Asthma (mild) never had a attach recently, use the puffs only when needed    Bipolar disorder     Chronic sinusitis     Depression     High cholesterol     HTN (hypertension)     Nasal polyps

## 2021-10-07 NOTE — ED BEHAVIORAL HEALTH ASSESSMENT NOTE - ADDITIONAL DETAILS ALL
Patient states he has tried to commit suicide "several times" and that the last time was "a long time ago."

## 2021-10-07 NOTE — ED ADULT NURSE NOTE - OBJECTIVE STATEMENT
Patient A&O, calm and cooperative presents to the ED c/o visual and auditory hallucination.  Patient does not want to elaborate on what he is seeing or hearing.  When asked if these voices are telling him to harm himself his response is no.  Patient reports that he has been off his medication for weeks, pt unable to recall names of medications.  Patient does not currently follow with psychiatrist.  Patient admits to drinking few beers prior to coming to ED.  Patient denies any drug use.  Patient denies SI/HI at this time. Patient placed in yellow gown for safety. Patient A&O, calm and cooperative presents to the ED c/o visual and auditory hallucination and states he "just does not feel well".  Patient does not want to elaborate on what he is seeing or hearing.  When asked if these voices are telling him to harm himself his response is no.  Patient reports that he has been off his medication for weeks, pt unable to recall names of medications.  Patient does not currently follow with psychiatrist.  Patient admits to drinking few beers prior to coming to ED.  Patient denies any drug use.  Patient denies SI/HI at this time. Patient placed in yellow gown for safety.

## 2021-10-07 NOTE — ED PROVIDER NOTE - NS ED ROS FT
Gen: denies fever, chills, fatigue, weight loss  Skin: denies rashes, laceration, bruising  HEENT: denies visual changes, ear pain, nasal congestion, throat pain  Respiratory: denies LUND, SOB, cough, wheezing  Cardiovascular: denies chest pain, palpitations, diaphoresis, LE edema  GI: denies abdominal pain, n/v/d  : denies dysuria, frequency, urgency, bowel/bladder incontinence  MSK: denies joint swelling/pain, back pain, neck pain  Neuro: denies headache, dizziness, weakness, numbness  Psych: +visual/auditory hallucinations. denies anxiety, depression, SI/HI

## 2021-10-07 NOTE — ED PROVIDER NOTE - PHYSICAL EXAMINATION
Gen: No acute distress, non toxic  HEENT: Mucous membranes moist, pink conjunctivae, EOMI  CV: RRR, nl s1/s2.  Resp: CTAB, normal rate and effort  GI: Abdomen soft, NT, ND. No rebound, no guarding  Neuro: A&O x 3, moving all 4 extremities  MSK: No spine or joint tenderness to palpation  Skin: No rashes. intact and perfused.   Psych: calm, cooperative. clear speech

## 2021-10-07 NOTE — ED BEHAVIORAL HEALTH ASSESSMENT NOTE - HPI (INCLUDE ILLNESS QUALITY, SEVERITY, DURATION, TIMING, CONTEXT, MODIFYING FACTORS, ASSOCIATED SIGNS AND SYMPTOMS)
Patient is a 61 year old AA male, living in a house with his girlfriend, currently unemployed, with a past psychiatric history of schizophrenia, bipolar disorder, depression, multiple past psychiatric admissions (last x1 yr ago at [facility]) and suicide attempts ("several, a long time ago"). Patient has a past medical history of HTN and is self-referred to the ED for AH due to being off his medications x3 weeks. Patient states he has not taken his medications x3 weeks and has been seeing "things" and hearing voices ever since. The voices tell the patient to hurt himself but denies that they tell him to hurt others. Patient would not elaborate what he was seeing or whether they were good or bad things. Patient states his girlfriend urged him to come to the hospital to get stabilized on his medications because he was seeing things she wasn't seeing and hearing things she wasn't hearing. Patient believes this is a plot that his girlfriend does because she is cheating on him and wants him "out of the house for a few weeks." Patient endorses poor sleep and states he often sleeps for an hour at a time and then wakes up, falls back asleep, and then wakes up again. Patient states his appetite has is "okay" and denies any weight changes.  Patient states he has been feeling "down" and "depressed" since he has stopped taking his medications. Patient endorses current feelings of hopelessness, worthlessness, restlessness, and anxiety. Patient denies current AH/VH (although he was seen internally preoccupied), active or passive S/P/I, H/I, or manic symptoms.

## 2021-10-07 NOTE — ED BEHAVIORAL HEALTH ASSESSMENT NOTE - OTHER PAST PSYCHIATRIC HISTORY (INCLUDE DETAILS REGARDING ONSET, COURSE OF ILLNESS, INPATIENT/OUTPATIENT TREATMENT)
Patient currently denies having an outside psychiatrist/psychologist. Denies taking any medications x3 weeks and does not know which medications he was taking.   Patient has a history of multiple inpatient psychiatric admissions (estimates 3-4), last x1 year ago at [facility]. Patient endorses multiple past suicide attempts but states they occurred "a long time ago."

## 2021-10-07 NOTE — ED BEHAVIORAL HEALTH ASSESSMENT NOTE - DETAILS
States the voices tell him to hurt himself but denies they tell him to hurt others. Patient endorses family history of psychiatric illness but does not know who or what they had. Patient states one person was hospitalized psychiatrically but would not elaborate. Self referred TBD Patient states he has thoughts of dying "sometimes" but would not elaborate. States he has had an increase in these thoughts since being non-compliant with his medications.

## 2021-10-07 NOTE — ED PROVIDER NOTE - NSICDXFAMILYHX_GEN_ALL_CORE_FT
FAMILY HISTORY:  Sibling  Still living? Yes, Estimated age: Age Unknown  Family history of breast cancer, Age at diagnosis: Age Unknown  Family history of prostate cancer, Age at diagnosis: Age Unknown

## 2021-10-07 NOTE — ED CDU PROVIDER INITIAL DAY NOTE - OBJECTIVE STATEMENT
60yo M pmhx bipolar ds, schizophrenia, HTN, HLD presents to ED c/o visual and auditory hallucinations. Pt unwilling to elaborate on what he is seeing/hearing or whether voices are threatening to him. States he has been off his psychiatric medications for a few months, not currently following with a psychiatrist. States he was trying to deal with it himself at home but his girlfriend urged him to seek psychiatric care. Admits to drinking 3 beers a few hours prior to arrival. Denies drug use. previously on zyprexa, seroquel. Denies SI/HI, cp, sob, headache, dizziness.

## 2021-10-07 NOTE — ED ADULT NURSE NOTE - HOW OFTEN DO YOU HAVE SIX OR MORE DRINKS ON ONE OCCASION?
History  Chief Complaint   Patient presents with    Tremors     Woke up today with full body tremors, was at MRI unable to complete study due to the tremors  This is a 77-year-old male with a history of CAD, COPD on 4 L nasal cannula at baseline, chronic back pain, dementia who presents with tremor  According to the wife, he has been suffering from intermittent full body tremors since the death of his sister in July  Patient states that the tremor comes and goes  The patient has a follow-up with Neurology in September regarding his tremor  Today, he was sent for an MRI of his thoracic spine due to his chronic back pain  He did take a small dose of Xanax this morning  However, the patient was unable to drink his coffee this morning due to his tremor  The patient went to his MRI but was unable to finish the entire MRI due to his tremor  When he came out of the MRI machine, the wife said his shaking was really bad  She decided to bring him to the emergency department for evaluation  On arrival, he states that his tremor is much improved  The wife agrees  Denies any new symptoms  Denies fever/chills, nausea/vomiting, lightheadedness/dizziness, numbness/weakness, headache, change in vision, URI symptoms, neck pain, chest pain, palpitations, shortness of breath, cough, back pain, flank pain, abdominal pain, diarrhea, hematochezia, melena, dysuria, hematuria  Prior to Admission Medications   Prescriptions Last Dose Informant Patient Reported? Taking? Bacillus Coagulans-Inulin (PROBIOTIC FORMULA) 1-250 BILLION-MG CAPS  Self Yes Yes   Sig: Take 1 capsule by mouth daily Record states dose is currently 4 billion   Cholecalciferol (VITAMIN D-3 PO)  Self Yes Yes   Sig: Take 2,000 Units by mouth daily  KRILL OIL PO  Self Yes Yes   Sig: Take 500 mg by mouth daily     Melatonin 10 MG TABS  Self Yes Yes   Sig: Take 2 tablets by mouth daily at bedtime Taking 15mg   Multiple Vitamins-Minerals (CENTRUM SILVER ADULT 50+) TABS  Self Yes Yes   Sig: Take 1 tablet by mouth daily   Potassium Citrate ER 15 MEQ (1620 MG) TBCR   No Yes   Sig: TAKE 1 TABLET TWICE A DAY   QUEtiapine (SEROquel) 25 mg tablet Not Taking at Unknown time  No No   Sig: Take 0 5 tablets (12 5 mg total) by mouth daily at bedtime   Patient not taking: Reported on 8/21/2020   albuterol (PROVENTIL HFA,VENTOLIN HFA) 90 mcg/act inhaler   Yes Yes   Sig: Inhale 2 puffs every 6 (six) hours as needed for wheezing   aspirin 81 MG tablet  Self Yes Yes   Sig: Take 81 mg by mouth daily Took within 24 hours    atenolol (TENORMIN) 25 mg tablet   No Yes   Sig: TAKE 1 TABLET AT BEDTIME   atorvastatin (LIPITOR) 20 mg tablet   No Yes   Sig: TAKE 1 TABLET AT BEDTIME   clopidogrel (PLAVIX) 75 mg tablet   No Yes   Sig: Take 1 tablet (75 mg total) by mouth daily   cyanocobalamin (VITAMIN B-12) 1,000 mcg tablet  Self Yes Yes   Sig: Take 1,000 mcg by mouth 3 (three) times a week MWF   donepezil (ARICEPT) 10 mg tablet   No Yes   Sig: TAKE 1 TABLET DAILY AT     BEDTIME   fluticasone-salmeterol (AirDuo RespiClick 99/11) 37-74 mcg/act dry powder inhaler   No Yes   Sig: Inhale 1 puff 2 (two) times a day AM & PM   furosemide (LASIX) 20 mg tablet   No Yes   Sig: Take 1 tablet (20 mg total) by mouth daily   memantine (NAMENDA) 5 mg tablet  Spouse/Significant Other Yes Yes   Sig: Take 5 mg by mouth 2 (two) times a day In the evening    nitroglycerin (NITROSTAT) 0 4 mg SL tablet   No Yes   Sig: PLACE 1 TABLET UNDER THE   TONGUE AND ALLOW TO        DISSOLVE EVERY 5 MINUTES ASNEEDED FOR CHEST PAIN   pantoprazole (PROTONIX) 40 mg tablet   No Yes   Sig: take 1 tablet by mouth once daily   tamsulosin (FLOMAX) 0 4 mg   No Yes   Sig: TAKE 1 CAPSULE DAILY   tiotropium (SPIRIVA HANDIHALER) 18 mcg inhalation capsule  Self Yes Yes   Sig: Place 18 mcg into inhaler and inhale daily        Facility-Administered Medications: None       Past Medical History:   Diagnosis Date    AAA [facility]abdominal aortic aneurysm) ([facility])     Acid reflux     Acute on chronic diastolic congestive heart failure ([facility]) 2/25/2020    Acute serous otitis media of left ear     recurrence not specified     Anesthesia     "always has mental changes /lingers and lingers after anesthesia"    Anxiety     Aortic aneurysm without rupture ([facility])     Arm bruise     right inner forearm "recent IV"    At risk for falls     BPH without urinary obstruction     Cancer ([address] )     skin CA on nose    CAP (community acquired pneumonia)     Cataracts, bilateral     Change in bowel function     Clubbing of fingers     Colon polyps     Common cold     Contusion of elbow, left     initial encounter     COPD (chronic obstructive pulmonary disease) ([facility])     Coronary artery disease     Cough     Dementia ([facility])     Depression     Dizziness     upon "standing quickly on occas"    Exercise counseling     Fatigue     Full dentures     "doesn't wear them"    Glaucoma screening     High cholesterol     History of abdominal aortic aneurysm (AAA) repair 08/2017    History of bacteremia     History of chronic obstructive lung disease     History of epistaxis     History of influenza vaccination     History of kidney stones     History of pneumonia     "many times" "at least 5 times" almost always goes to sepsis"    History of sepsis 10/2017    History of sinusitis     History of skin cancer     History of sleep apnea     History of transfusion     History of urinary tract infection     [place] (hard of hearing)     Hydronephrosis with obstructing calculus     Influenza vaccine needed     Jock itch     left/saw doctor 11/8 and started on antifungal cream    Kidney stones     Left hip pain     Need for pneumococcal vaccination     Need for prophylactic vaccination and inoculation against influenza     Other emphysema ([address] )     Pancreatitis, chronic ([address] )     pt and wife can't confirm 11/10/17    Pneumonia 10/26/2017    admitted [facility]  Pulmonary emphysema [facility]     Screening for genitourinary condition     Screening for neurological condition     Sepsis ([facility] )     due to unspecified organism     Short-term memory loss     Sleep apnea     does not use CPAP   Special screening examination for neoplasm of prostate     TIA involving carotid artery     "before carotid surgery"    Ulcer     stomach, "years ago"    Unsteady gait     Use of cane as ambulatory aid     sometimes    Vitamin D deficiency     Wears glasses        Past Surgical History:   Procedure Laterality Date    ABDOMINAL AORTIC ANEURYSM REPAIR  08/23/2017    ABDOMINAL AORTIC ANEURYSM REPAIR, ENDOVASCULAR      BACK SURGERY      spinal stenosis    CARDIAC SURGERY      CABG x3    CAROTID ENDARTARECTOMY Left 11/1996    CATARACT EXTRACTION Bilateral     CORONARY ARTERY BYPASS GRAFT  01/2003    x3    CYSTOSCOPY      with insertion of ureteral stent     CYSTOSCOPY      with ureteroscopy with lithotripsy     EXCISIONAL HEMORRHOIDECTOMY      EYE SURGERY Bilateral     "for a wrinkle" after cataract surgery    HERNIA REPAIR      umbilical    LITHOTRIPSY      renal    MOUTH SURGERY      full mouth extraction     AZ COLONOSCOPY FLX DX W/COLLJ SPEC WHEN PFRMD N/A 5/16/2017    Procedure: COLONOSCOPY with polypectomies/ hot snare and tattoo;  Surgeon: Celia Klein MD;  [facility]: [facility]; Service: Gastroenterology    AZ CYSTOURETHROSCOPY N/A 11/30/2017    Procedure: Елена Lemons;  Surgeon: Alea Gutierrez MD;  [facility];  Service: Urology    AZ ESOPHAGOGASTRODUODENOSCOPY TRANSORAL DIAGNOSTIC N/A 8/18/2016    Procedure: ESOPHAGOGASTRODUODENOSCOPY (EGD); Surgeon: Celia Klein MD;  [facility];   Service: Gastroenterology    AZ REMOVE BLADDER STONE,<2 5 CM N/A 11/30/2017    Procedure: Karen Rooney;  Surgeon: Alea Gutierrez MD;  [facility];  Service: Urology    SKIN BIOPSY     [address] PLAC[address]      and removal       Family History   Problem Relation Age of Onset    Diabetes type II Mother         mellitus    Kidney failure Father     Diabetes type II Maternal Grandmother         mellitus     Hypertension Paternal Grandmother         benign essential      I have reviewed and agree with the history as documented  E-Cigarette/Vaping    E-Cigarette Use Never User      E-Cigarette/Vaping Substances    Nicotine No     THC No     CBD No     Flavoring No     Other No     Unknown No      Social History     Tobacco Use    Smoking status: Former Smoker     Packs/day: 1 50     Years: 60 00     Pack years: 90 00     Last attempt to quit:      Years since quittin 6    Smokeless tobacco: Never Used    Tobacco comment:  used to be a 1-1 5 ppd smoker   Substance Use Topics    Alcohol use: Not Currently     Alcohol/week: 0 0 standard drinks     Frequency: Never     Drinks per session: 1 or 2     Binge frequency: Never     Comment: quit 25 yrs ago    Drug use: No     Comment: No illicit drug use        Review of Systems   Constitutional: Negative for chills, fatigue and fever  HENT: Negative for rhinorrhea, sore throat and trouble swallowing  Eyes: Negative for photophobia and visual disturbance  Respiratory: Negative for cough, chest tightness and shortness of breath  Cardiovascular: Negative for chest pain, palpitations and leg swelling  Gastrointestinal: Negative for abdominal pain, blood in stool, diarrhea, nausea and vomiting  Endocrine: Negative for polyuria  Genitourinary: Negative for dysuria, flank pain and hematuria  Musculoskeletal: Negative for back pain and neck pain  Skin: Negative for color change and rash  Allergic/Immunologic: Negative for immunocompromised state  Neurological: Positive for tremors  Negative for dizziness, weakness, light-headedness, numbness and headaches  All other systems reviewed and are negative        Physical Exam  Physical Exam  Constitutional:       General: He is not in acute distress  Appearance: Normal appearance  He is well-developed  HENT:      Mouth/Throat:      Pharynx: Uvula midline  Eyes:      General: Lids are normal       Conjunctiva/sclera: Conjunctivae normal       Pupils: Pupils are equal, round, and reactive to light  Neck:      Thyroid: No thyroid mass or thyromegaly  Trachea: Trachea normal    Cardiovascular:      Rate and Rhythm: Normal rate and regular rhythm  Pulses: Normal pulses  Heart sounds: Normal heart sounds  No murmur  Pulmonary:      Effort: Pulmonary effort is normal       Breath sounds: Wheezing present  Abdominal:      General: Bowel sounds are normal       Palpations: Abdomen is soft  Tenderness: There is no abdominal tenderness  There is no guarding or rebound  Negative signs include Meléndez's sign  Skin:     General: Skin is warm and dry  Neurological:      Mental Status: He is alert and oriented to person, place, and time  GCS: GCS eye subscore is 4  GCS verbal subscore is 5  GCS motor subscore is 6  Cranial Nerves: Cranial nerves are intact  Sensory: Sensation is intact  Motor: Motor function is intact  Comments: Fine resting tremor noted right hand  Action tremor of bilateral upper extremities  Psychiatric:         Speech: Speech normal          Behavior: Behavior normal  Behavior is cooperative  Thought Content:  Thought content normal          Vital Signs  ED Triage Vitals [08/21/20 1444]   Temperature Pulse Respirations Blood Pressure SpO2   97 5 °F (36 4 °C) 72 18 129/82 97 %      Temp Source Heart Rate Source Patient Position - Orthostatic VS BP Location FiO2 (%)   Temporal Monitor -- Left arm --      Pain Score       8           Vitals:    08/21/20 1444 08/21/20 1545   BP: 129/82    Pulse: 72 72         Visual Acuity      ED Medications  Medications   diazepam (VALIUM) tablet 2 mg (2 mg Oral Given 8/21/20 1546)   acetaminophen (TYLENOL) tablet 650 mg (650 mg Oral Given 8/21/20 1547)       Diagnostic Studies  Results Reviewed     Procedure Component Value Units Date/Time    Basic metabolic panel [[id number]]  (Abnormal) Collected:  08/21/20 1542    Lab Status:  Final result Specimen:  Blood from Arm, Right Updated:  08/21/20 1608     Sodium 141 mmol/L      Potassium 4 6 mmol/L      Chloride 103 mmol/L      CO2 26 mmol/L      ANION GAP 12 mmol/L      BUN 16 mg/dL      Creatinine 1 40 mg/dL      Glucose 99 mg/dL      Calcium 9 4 mg/dL      eGFR 48 ml/min/1 73sq m     Narrative:       National Kidney Disease Foundation guidelines for Chronic Kidney Disease (CKD):     Stage 1 with normal or high GFR (GFR > 90 mL/min/1 73 square meters)    Stage 2 Mild CKD (GFR = 60-89 mL/min/1 73 square meters)    Stage 3A Moderate CKD (GFR = 45-59 mL/min/1 73 square meters)    Stage 3B Moderate CKD (GFR = 30-44 mL/min/1 73 square meters)    Stage 4 Severe CKD (GFR = 15-29 mL/min/1 73 square meters)    Stage 5 End Stage CKD (GFR <15 mL/min/1 73 square meters)  Note: GFR calculation is accurate only with a steady state creatinine    CBC and differential [[id number]]  (Abnormal) Collected:  08/21/20 1542    Lab Status:  Final result Specimen:  Blood from Arm, Right Updated:  08/21/20 1551     WBC 5 94 Thousand/uL      RBC 5 48 Million/uL      Hemoglobin 17 1 g/dL      Hematocrit 51 7 %      MCV 94 fL      MCH 31 2 pg      MCHC 33 1 g/dL      RDW 14 1 %      MPV 9 9 fL      Platelets 171 Thousands/uL      nRBC 0 /100 WBCs      Neutrophils Relative 57 %      Immat GRANS % 0 %      Lymphocytes Relative 27 %      Monocytes Relative 12 %      Eosinophils Relative 3 %      Basophils Relative 1 %      Neutrophils Absolute 3 41 Thousands/µL      Immature Grans Absolute 0 02 Thousand/uL      Lymphocytes Absolute 1 59 Thousands/µL      Monocytes Absolute 0 70 Thousand/µL      Eosinophils Absolute 0 17 Thousand/µL      Basophils Absolute 0 05 Thousands/µL                  No orders to display              Procedures  ECG 12 Lead Documentation Only    Date/Time: 8/21/2020 3:30 PM  Performed by: Louie Alfaro MD  Authorized by: Louie Alfaro MD     ECG reviewed by me, the ED Provider: yes    Patient location:  ED  Previous ECG:     Previous ECG:  Compared to current    Comparison ECG info:  8/4/20    Similarity:  No change    Comparison to cardiac monitor: Yes    Interpretation:     Interpretation: normal    Rate:     ECG rate:  75    ECG rate assessment: normal    Rhythm:     Rhythm: sinus rhythm and A-V block    Ectopy:     Ectopy: none    QRS:     QRS axis:  Normal    QRS intervals:  Normal  Conduction:     Conduction: abnormal      Abnormal conduction: 1st degree    ST segments:     ST segments:  Normal  T waves:     T waves: normal               ED Course  ED Course as of Aug 21 1657   Fri Aug 21, 2020   1555 Stable thrombocytopenia   Platelet Count(!): 511   1648 Patient states that he feels sore, but the tremor feels better  The Valium seems to have helped his back pain as well  Will prescribe Robaxin home  Patient and wife instructed to take at night to evaluate effects  Stop the medicine if it makes him too drowsy  Follow up with family doctor and Neurology  Strict return precautions given  US AUDIT      Most Recent Value   Initial Alcohol Screen: US AUDIT-C    1  How often do you have a drink containing alcohol?  0 Filed at: 08/21/2020 1550   2  How many drinks containing alcohol do you have on a typical day you are drinking? 0 Filed at: 08/21/2020 1550   3a  Male UNDER 65: How often do you have five or more drinks on one occasion? 0 Filed at: 08/21/2020 1550   3b  FEMALE Any Age, or MALE 65+: How often do you have 4 or more drinks on one occassion? 0 Filed at: 08/21/2020 1550   Audit-C Score  0 Filed at: 08/21/2020 1550                  NEAL/DAST-10      Most Recent Value   How many times in the past year have you    Used an illegal drug or used a prescription medication for non-medical reasons? Never Filed at: 08/21/2020 1538                                [facility]  Number of Diagnoses or Management Options  Diagnosis management comments: Anxiety verses tremor related to parkinsonianism  Plan to check EKG and electrolytes  Will give a dose of Valium  Follow up with Neurology  Disposition  Final diagnoses:   Tremor     Time reflects when diagnosis was documented in both MDM as applicable and the Disposition within this note     Time User Action Codes Description Comment    8/21/2020  4:49 PM Sowmya Jay Add [R25 1] Tremor       ED Disposition     ED Disposition Condition Date/Time Comment    Discharge Stable Fri Aug 21, 2020  4:49 PM Caresse Ego discharge to home/self care  Follow-up Information     Follow up With Specialties Details Why Contact Info Additional Information    Taylor Person, DO Family Medicine Schedule an appointment as soon as possible for a visit   [address]  [facility] [address] [facility] Emergency Department Emergency Medicine Go to  If symptoms worsen [place]  [id number]  [phone] [place] ED, [address], [address]          Patient's Medications   Discharge Prescriptions    METHOCARBAMOL (ROBAXIN) 500 MG TABLET    Take 1 tablet (500 mg total) by mouth 2 (two) times a day       Start Date: 8/21/2020 End Date: --       Order Dose: 500 mg       Quantity: 20 tablet    Refills: 0     No discharge procedures on file      PDMP Review       Value Time User    PDMP Reviewed  Yes 8/5/2020  1:12 AM Vernell Willett MD          ED Provider  Electronically Signed by           Rob Finnegan MD  08/21/20 6054 Never

## 2021-10-07 NOTE — ED BEHAVIORAL HEALTH ASSESSMENT NOTE - RISK ASSESSMENT
RF: Past suicide attempts, multiple past inpatient psychiatric admissions, psychiatric diagnoses, substance use, non-compliant with medications, command hallucinations to hurt himself   Protective: Residential stability, relationship stability, support from others, comes to hospital when in crisis Moderate Acute Suicide Risk

## 2021-10-07 NOTE — ED PROVIDER NOTE - CLINICAL SUMMARY MEDICAL DECISION MAKING FREE TEXT BOX
62yo M presenting with visual/auditory hallucinations, off psych meds past few months. No medical complaints, no cp, no sob. Labs, EKG,  eval

## 2021-10-08 VITALS
HEART RATE: 69 BPM | SYSTOLIC BLOOD PRESSURE: 170 MMHG | OXYGEN SATURATION: 100 % | TEMPERATURE: 98 F | DIASTOLIC BLOOD PRESSURE: 76 MMHG

## 2021-10-08 PROCEDURE — 99217: CPT

## 2021-10-08 PROCEDURE — G0378: CPT

## 2021-10-08 PROCEDURE — 80048 BASIC METABOLIC PNL TOTAL CA: CPT

## 2021-10-08 PROCEDURE — 81001 URINALYSIS AUTO W/SCOPE: CPT

## 2021-10-08 PROCEDURE — U0003: CPT

## 2021-10-08 PROCEDURE — U0005: CPT

## 2021-10-08 PROCEDURE — 93005 ELECTROCARDIOGRAM TRACING: CPT

## 2021-10-08 PROCEDURE — 99285 EMERGENCY DEPT VISIT HI MDM: CPT

## 2021-10-08 PROCEDURE — 36415 COLL VENOUS BLD VENIPUNCTURE: CPT

## 2021-10-08 PROCEDURE — 80307 DRUG TEST PRSMV CHEM ANLYZR: CPT

## 2021-10-08 PROCEDURE — 85025 COMPLETE CBC W/AUTO DIFF WBC: CPT

## 2021-10-08 RX ORDER — HYDROCHLOROTHIAZIDE 25 MG
12.5 TABLET ORAL ONCE
Refills: 0 | Status: COMPLETED | OUTPATIENT
Start: 2021-10-08 | End: 2021-10-08

## 2021-10-08 RX ORDER — AMLODIPINE BESYLATE 2.5 MG/1
5 TABLET ORAL DAILY
Refills: 0 | Status: DISCONTINUED | OUTPATIENT
Start: 2021-10-08 | End: 2021-10-11

## 2021-10-08 RX ADMIN — Medication 1 MILLIGRAM(S): at 02:08

## 2021-10-08 RX ADMIN — Medication 12.5 MILLIGRAM(S): at 13:34

## 2021-10-08 RX ADMIN — AMLODIPINE BESYLATE 5 MILLIGRAM(S): 2.5 TABLET ORAL at 13:28

## 2021-10-08 NOTE — ED ADULT NURSE REASSESSMENT NOTE - NS ED NURSE REASSESS COMMENT FT1
assumed pt. care from Suzanne RAMIREZ.
psych at bedside for consult
pt remains a&ox3, denies any pain/discomfort. oob ad tameka. dinner provided. voiding. pending psych admission. updated on plan of care, verbalize understanding. call bell in reach
received pt from off going shift. pt a&ox3, denies any pain/discomfort. in yellow gown w/ belongings previously secured. pt denies any drug or alcohol use, si/hi, wishes not to discuss hallucinations. urine and blood sent. pending psych. updated on plan of care, verbalize understanding. call bell in reach
Pt received @ 0730, A&OX3, amb ad tameka, in yellow gown.  Pt denies any pain, VSS.  Pt still waiting to see psych.  Abd soft nondistended, nontender, moving all ext well.  Will continue to monitor.

## 2021-10-08 NOTE — ED ADULT NURSE REASSESSMENT NOTE - COMFORT CARE
ambulated to bathroom/meal provided/plan of care explained/wait time explained/warm blanket provided
plan of care explained/wait time explained

## 2021-10-08 NOTE — ED CDU PROVIDER DISPOSITION NOTE - CLINICAL COURSE
no events overnight; requires inpatient psychiatric admission; given home meds BP, otherwise medically cleared for inpatient psychiatric transfer

## 2021-10-08 NOTE — CHART NOTE - NSCHARTNOTEFT_GEN_A_CORE
J CARLOS Note: Notified by  team that the plan is to transfer pt for inpt psychiatric care. Reviewed chart. Dr. Palumbo reports  legal status will be 9.37. Called [facility] [phone], no beds. Referral made to [facility], pending bed census and MD review. SW to follow

## 2021-10-09 NOTE — CHART NOTE - NSCHARTNOTEFT_GEN_A_CORE
Social Work Note: SW obtained auth for inpatient mental health admission. Auth approved for 3 days October 8th - 10th with concurrent review on Monday October 11th. Auth # [id number], person providing auth is Rodney hoang care advocate will reach out to [facility] on Monday 10/11.

## 2021-10-12 DIAGNOSIS — Z71.89 OTHER SPECIFIED COUNSELING: ICD-10-CM

## 2021-11-01 PROCEDURE — G9005: CPT

## 2022-06-07 NOTE — ED BEHAVIORAL HEALTH ASSESSMENT NOTE - EMPLOYMENT
Situation:  Outreach for routine follow-up   - Spoke with LIZZ Luna Se remains at [facility]. The family continues to look for additional rehabilitation facilities. RN provided them with the following other rehab locations: [facility]), [facility]), and [facility]. Herman Crescencio relayed that Ag Huynh contacted her on Friday and that they are going to defer a colonoscopy at this time. Plan:  RN will call in 2 weeks to see if there are any other resources the family needs. If there arenât; I will close [facility] program at that time. Disabled

## 2022-11-24 ENCOUNTER — EMERGENCY (EMERGENCY)
Facility: HOSPITAL | Age: 62
LOS: 1 days | Discharge: LEFT BEFORE DISCHARGE | End: 2022-11-24
Payer: MEDICAID

## 2022-11-24 VITALS
DIASTOLIC BLOOD PRESSURE: 73 MMHG | RESPIRATION RATE: 20 BRPM | SYSTOLIC BLOOD PRESSURE: 161 MMHG | WEIGHT: 229.94 LBS | HEIGHT: 72 IN | HEART RATE: 82 BPM | OXYGEN SATURATION: 97 % | TEMPERATURE: 98 F

## 2022-11-24 DIAGNOSIS — Z96.652 PRESENCE OF LEFT ARTIFICIAL KNEE JOINT: Chronic | ICD-10-CM

## 2022-11-24 DIAGNOSIS — Z98.89 OTHER SPECIFIED POSTPROCEDURAL STATES: Chronic | ICD-10-CM

## 2022-11-24 PROCEDURE — 99283 EMERGENCY DEPT VISIT LOW MDM: CPT

## 2022-11-24 PROCEDURE — L9991: CPT

## 2022-11-24 PROCEDURE — 93010 ELECTROCARDIOGRAM REPORT: CPT

## 2022-11-24 PROCEDURE — 93005 ELECTROCARDIOGRAM TRACING: CPT

## 2022-11-24 NOTE — ED ADULT TRIAGE NOTE - CHIEF COMPLAINT QUOTE
pt a+ox3, BIBA from home c/o sudden onset of chest pain while reading legal documents. rec'd 1-nitro SL by EMS en route, reports relief of pain.

## 2022-12-06 ENCOUNTER — EMERGENCY (EMERGENCY)
Facility: HOSPITAL | Age: 62
LOS: 1 days | Discharge: OTHER INSTITUTION | End: 2022-12-06
Attending: EMERGENCY MEDICINE
Payer: MEDICAID

## 2022-12-06 VITALS
OXYGEN SATURATION: 98 % | SYSTOLIC BLOOD PRESSURE: 166 MMHG | DIASTOLIC BLOOD PRESSURE: 72 MMHG | TEMPERATURE: 98 F | HEART RATE: 76 BPM | RESPIRATION RATE: 18 BRPM

## 2022-12-06 VITALS
HEIGHT: 72 IN | RESPIRATION RATE: 16 BRPM | DIASTOLIC BLOOD PRESSURE: 115 MMHG | SYSTOLIC BLOOD PRESSURE: 205 MMHG | OXYGEN SATURATION: 97 % | WEIGHT: 225.09 LBS | HEART RATE: 73 BPM | TEMPERATURE: 98 F

## 2022-12-06 DIAGNOSIS — F19.10 OTHER PSYCHOACTIVE SUBSTANCE ABUSE, UNCOMPLICATED: ICD-10-CM

## 2022-12-06 DIAGNOSIS — Z98.89 OTHER SPECIFIED POSTPROCEDURAL STATES: Chronic | ICD-10-CM

## 2022-12-06 DIAGNOSIS — Z96.652 PRESENCE OF LEFT ARTIFICIAL KNEE JOINT: Chronic | ICD-10-CM

## 2022-12-06 DIAGNOSIS — F20.0 PARANOID SCHIZOPHRENIA: ICD-10-CM

## 2022-12-06 LAB
ALBUMIN SERPL ELPH-MCNC: 3.9 G/DL — SIGNIFICANT CHANGE UP (ref 3.3–5.2)
ALP SERPL-CCNC: 88 U/L — SIGNIFICANT CHANGE UP (ref 40–120)
ALT FLD-CCNC: 12 U/L — SIGNIFICANT CHANGE UP
AMPHET UR-MCNC: NEGATIVE — SIGNIFICANT CHANGE UP
ANION GAP SERPL CALC-SCNC: 9 MMOL/L — SIGNIFICANT CHANGE UP (ref 5–17)
APAP SERPL-MCNC: <3 UG/ML — LOW (ref 10–26)
APPEARANCE UR: ABNORMAL
AST SERPL-CCNC: 12 U/L — SIGNIFICANT CHANGE UP
BACTERIA # UR AUTO: ABNORMAL
BARBITURATES UR SCN-MCNC: NEGATIVE — SIGNIFICANT CHANGE UP
BASOPHILS # BLD AUTO: 0.04 K/UL — SIGNIFICANT CHANGE UP (ref 0–0.2)
BASOPHILS NFR BLD AUTO: 0.5 % — SIGNIFICANT CHANGE UP (ref 0–2)
BENZODIAZ UR-MCNC: NEGATIVE — SIGNIFICANT CHANGE UP
BILIRUB SERPL-MCNC: 1 MG/DL — SIGNIFICANT CHANGE UP (ref 0.4–2)
BILIRUB UR-MCNC: NEGATIVE — SIGNIFICANT CHANGE UP
BUN SERPL-MCNC: 12 MG/DL — SIGNIFICANT CHANGE UP (ref 8–20)
CALCIUM SERPL-MCNC: 9 MG/DL — SIGNIFICANT CHANGE UP (ref 8.4–10.5)
CHLORIDE SERPL-SCNC: 105 MMOL/L — SIGNIFICANT CHANGE UP (ref 96–108)
CO2 SERPL-SCNC: 22 MMOL/L — SIGNIFICANT CHANGE UP (ref 22–29)
COCAINE METAB.OTHER UR-MCNC: POSITIVE
COLOR SPEC: YELLOW — SIGNIFICANT CHANGE UP
CREAT SERPL-MCNC: 0.86 MG/DL — SIGNIFICANT CHANGE UP (ref 0.5–1.3)
DIFF PNL FLD: ABNORMAL
EGFR: 98 ML/MIN/1.73M2 — SIGNIFICANT CHANGE UP
EOSINOPHIL # BLD AUTO: 0.07 K/UL — SIGNIFICANT CHANGE UP (ref 0–0.5)
EOSINOPHIL NFR BLD AUTO: 0.8 % — SIGNIFICANT CHANGE UP (ref 0–6)
EPI CELLS # UR: SIGNIFICANT CHANGE UP
ETHANOL SERPL-MCNC: <10 MG/DL — SIGNIFICANT CHANGE UP (ref 0–9)
GLUCOSE SERPL-MCNC: 129 MG/DL — HIGH (ref 70–99)
GLUCOSE UR QL: NEGATIVE MG/DL — SIGNIFICANT CHANGE UP
HCT VFR BLD CALC: 40.9 % — SIGNIFICANT CHANGE UP (ref 39–50)
HGB BLD-MCNC: 13.2 G/DL — SIGNIFICANT CHANGE UP (ref 13–17)
IMM GRANULOCYTES NFR BLD AUTO: 0.2 % — SIGNIFICANT CHANGE UP (ref 0–0.9)
KETONES UR-MCNC: NEGATIVE — SIGNIFICANT CHANGE UP
LEUKOCYTE ESTERASE UR-ACNC: ABNORMAL
LYMPHOCYTES # BLD AUTO: 2.54 K/UL — SIGNIFICANT CHANGE UP (ref 1–3.3)
LYMPHOCYTES # BLD AUTO: 30.2 % — SIGNIFICANT CHANGE UP (ref 13–44)
MCHC RBC-ENTMCNC: 28.1 PG — SIGNIFICANT CHANGE UP (ref 27–34)
MCHC RBC-ENTMCNC: 32.3 GM/DL — SIGNIFICANT CHANGE UP (ref 32–36)
MCV RBC AUTO: 87 FL — SIGNIFICANT CHANGE UP (ref 80–100)
METHADONE UR-MCNC: NEGATIVE — SIGNIFICANT CHANGE UP
MONOCYTES # BLD AUTO: 0.7 K/UL — SIGNIFICANT CHANGE UP (ref 0–0.9)
MONOCYTES NFR BLD AUTO: 8.3 % — SIGNIFICANT CHANGE UP (ref 2–14)
NEUTROPHILS # BLD AUTO: 5.04 K/UL — SIGNIFICANT CHANGE UP (ref 1.8–7.4)
NEUTROPHILS NFR BLD AUTO: 60 % — SIGNIFICANT CHANGE UP (ref 43–77)
NITRITE UR-MCNC: NEGATIVE — SIGNIFICANT CHANGE UP
OPIATES UR-MCNC: NEGATIVE — SIGNIFICANT CHANGE UP
PCP SPEC-MCNC: SIGNIFICANT CHANGE UP
PCP UR-MCNC: NEGATIVE — SIGNIFICANT CHANGE UP
PH UR: 7 — SIGNIFICANT CHANGE UP (ref 5–8)
PLATELET # BLD AUTO: 331 K/UL — SIGNIFICANT CHANGE UP (ref 150–400)
POTASSIUM SERPL-MCNC: 3.7 MMOL/L — SIGNIFICANT CHANGE UP (ref 3.5–5.3)
POTASSIUM SERPL-SCNC: 3.7 MMOL/L — SIGNIFICANT CHANGE UP (ref 3.5–5.3)
PROT SERPL-MCNC: 6.6 G/DL — SIGNIFICANT CHANGE UP (ref 6.6–8.7)
PROT UR-MCNC: 15
RBC # BLD: 4.7 M/UL — SIGNIFICANT CHANGE UP (ref 4.2–5.8)
RBC # FLD: 12.5 % — SIGNIFICANT CHANGE UP (ref 10.3–14.5)
RBC CASTS # UR COMP ASSIST: ABNORMAL /HPF (ref 0–4)
SALICYLATES SERPL-MCNC: <0.6 MG/DL — LOW (ref 10–20)
SARS-COV-2 RNA SPEC QL NAA+PROBE: SIGNIFICANT CHANGE UP
SODIUM SERPL-SCNC: 136 MMOL/L — SIGNIFICANT CHANGE UP (ref 135–145)
SP GR SPEC: 1.01 — SIGNIFICANT CHANGE UP (ref 1.01–1.02)
THC UR QL: NEGATIVE — SIGNIFICANT CHANGE UP
UROBILINOGEN FLD QL: NEGATIVE MG/DL — SIGNIFICANT CHANGE UP
WBC # BLD: 8.41 K/UL — SIGNIFICANT CHANGE UP (ref 3.8–10.5)
WBC # FLD AUTO: 8.41 K/UL — SIGNIFICANT CHANGE UP (ref 3.8–10.5)
WBC UR QL: >50 /HPF (ref 0–5)

## 2022-12-06 PROCEDURE — 85025 COMPLETE CBC W/AUTO DIFF WBC: CPT

## 2022-12-06 PROCEDURE — 93005 ELECTROCARDIOGRAM TRACING: CPT

## 2022-12-06 PROCEDURE — 99284 EMERGENCY DEPT VISIT MOD MDM: CPT | Mod: 25

## 2022-12-06 PROCEDURE — U0005: CPT

## 2022-12-06 PROCEDURE — 81001 URINALYSIS AUTO W/SCOPE: CPT

## 2022-12-06 PROCEDURE — 99220: CPT

## 2022-12-06 PROCEDURE — 36415 COLL VENOUS BLD VENIPUNCTURE: CPT

## 2022-12-06 PROCEDURE — U0003: CPT

## 2022-12-06 PROCEDURE — 80307 DRUG TEST PRSMV CHEM ANLYZR: CPT

## 2022-12-06 PROCEDURE — 93010 ELECTROCARDIOGRAM REPORT: CPT

## 2022-12-06 PROCEDURE — 90792 PSYCH DIAG EVAL W/MED SRVCS: CPT

## 2022-12-06 PROCEDURE — G0378: CPT

## 2022-12-06 PROCEDURE — 80053 COMPREHEN METABOLIC PANEL: CPT

## 2022-12-06 RX ORDER — AMLODIPINE BESYLATE 2.5 MG/1
10 TABLET ORAL ONCE
Refills: 0 | Status: COMPLETED | OUTPATIENT
Start: 2022-12-06 | End: 2022-12-06

## 2022-12-06 RX ADMIN — AMLODIPINE BESYLATE 10 MILLIGRAM(S): 2.5 TABLET ORAL at 08:22

## 2022-12-06 NOTE — ED BEHAVIORAL HEALTH ASSESSMENT NOTE - VIOLENCE RISK FACTORS:
Feeling of being under threat and being unable to control threat/Substance abuse/Noncompliance with treatment

## 2022-12-06 NOTE — ED BEHAVIORAL HEALTH ASSESSMENT NOTE - DESCRIPTION
Patient presents lethargic, lying in bed covered in sheets, guarded to questions.     ICU Vital Signs Last 24 Hrs  T(C): 36.8 (06 Dec 2022 07:25), Max: 36.8 (06 Dec 2022 07:25)  T(F): 98.3 (06 Dec 2022 07:25), Max: 98.3 (06 Dec 2022 07:25)  HR: 65 (06 Dec 2022 07:25) (65 - 73)  BP: 180/106 (06 Dec 2022 07:25) (180/106 - 205/115)  BP(mean): --  ABP: --  ABP(mean): --  RR: 17 (06 Dec 2022 07:25) (16 - 17)  SpO2: 99% (06 Dec 2022 07:25) (97% - 99%)    O2 Parameters below as of 06 Dec 2022 05:41  Patient On (Oxygen Delivery Method): room air Lives at a family house, no longer allowed to stay with girlfriend, history of crack cocaine use, unknown last use HTN Patient presents lethargic, lying in bed covered in sheets, guarded w/ poor eye contact.  Reports hearing voices and feeling unsafe.     ICU Vital Signs Last 24 Hrs  T(C): 36.8 (06 Dec 2022 07:25), Max: 36.8 (06 Dec 2022 07:25)  T(F): 98.3 (06 Dec 2022 07:25), Max: 98.3 (06 Dec 2022 07:25)  HR: 65 (06 Dec 2022 07:25) (65 - 73)  BP: 180/106 (06 Dec 2022 07:25) (180/106 - 205/115)  BP(mean): --  ABP: --  ABP(mean): --  RR: 17 (06 Dec 2022 07:25) (16 - 17)  SpO2: 99% (06 Dec 2022 07:25) (97% - 99%)    O2 Parameters below as of 06 Dec 2022 05:41  Patient On (Oxygen Delivery Method): room air

## 2022-12-06 NOTE — ED ADULT TRIAGE NOTE - CHIEF COMPLAINT QUOTE
Pt presents to ED requesting a psych eval + refills of his Depakote and Seroquel. Pt states he is hearing voices that don't tell him to do anything specific, "it's just a lot of noise." Denies SI/HI. Hx of HTN, non-compliant with those meds as well.

## 2022-12-06 NOTE — CHART NOTE - NSCHARTNOTEFT_GEN_A_CORE
SWNote: pt accepted by Dr Segovia 9.13 status. [facility] called , usman arranged ETA 60-90 min. SWNote: pt accepted by Dr Segovia 9.13 status. [facility] called , usman arranged ETA 60-90 min. Transfer will need auth SW to follow asap.

## 2022-12-06 NOTE — ED CDU PROVIDER DISPOSITION NOTE - CLINICAL COURSE
Patient presented with hallucinations after not taking medications for a few weeks. Medically stable for inpatient psychiatric treatment. Accepted to [facility].

## 2022-12-06 NOTE — ED BEHAVIORAL HEALTH ASSESSMENT NOTE - OTHER PAST PSYCHIATRIC HISTORY (INCLUDE DETAILS REGARDING ONSET, COURSE OF ILLNESS, INPATIENT/OUTPATIENT TREATMENT)
Patient currently denies having an outside psychiatrist/psychologist. Denies taking any medications for over 3 weeks.  Patient has a history of multiple short inpatient psychiatric admissions at [facility].

## 2022-12-06 NOTE — ED BEHAVIORAL HEALTH ASSESSMENT NOTE - LEVEL OF CONSCIOUSNESS
[facility], [place]    [facility] [id number] study by Loren Jacobson, [id number]) and there were increased CD34-positive [facility] Salazar.    SHAYY Knight, CNP  Hematology/Oncology  Pager [phone]    Lethargic, arousable to verbal stimulus

## 2022-12-06 NOTE — ED CDU PROVIDER INITIAL DAY NOTE - DETAILS
Patient with paranoid schizophrenia, off meds for some time presents for hallucinations telling him to hurt himself, medically stable for psych evaluation and treatment, will go inpatient psych admission.

## 2022-12-06 NOTE — ED BEHAVIORAL HEALTH ASSESSMENT NOTE - HPI (INCLUDE ILLNESS QUALITY, SEVERITY, DURATION, TIMING, CONTEXT, MODIFYING FACTORS, ASSOCIATED SIGNS AND SYMPTOMS)
61 year old male with PMHx of HTN, living in a family home, currently unemployed, with a past psychiatric history of schizophrenia, bipolar disorder, depression, and multiple past psychiatric admissions at [facility] presents as walk-in c/o auditory hallucinations including sounds such as "crying and laughing" as well as voices that are out to get him. Pt endorses HI against these unspecified people out to get him. Pt denies current SI. Pt reports feeling agitated and reports he has been off his psych medications, Depakote and Seroquel, for over 3 weeks. States he doesn't see a psychiatrist and gets his medications from an unspecified hospital or program 2 months ago. As per patient's girlfriend, Era, [phone], patient has a serious drug abuse problem which she believes involves smoking crack due to pipes being found in the house. She states he hasn't been living with her for over a month and has been staying at a family home on [address] in [place]. The girlfriend reports she has an order of protection against him because he was acting agitated and hearing voices, ie: patient hearing her getting beat up or with another man in the other room. As per girlfriend, patient last checked out of a rehab center by Stephanie bennett over a month ago and isn't sure if he is still at the family home.

## 2022-12-06 NOTE — ED BEHAVIORAL HEALTH ASSESSMENT NOTE - RISK ASSESSMENT
RF: Multiple past inpatient psychiatric admissions, psychiatric diagnoses, substance use, non-compliant with medications, AH  Protective: comes to hospital when in crisis RF: Multiple past inpatient psychiatric admissions, psychiatric diagnoses, substance use, non-compliant with medications, AH, paranoia   Protective: comes to hospital when in crisis

## 2022-12-06 NOTE — ED BEHAVIORAL HEALTH ASSESSMENT NOTE - SUMMARY
Patient has a past psychiatric history of bipolar disorder, depression, schizophrenia, multiple inpatient admissions at [facility] presents to the ED, self referred after being off his medications for over 3 weeks, hearing noises and voices. Pt states he is agitated and endorses HI against the unspecified people out to get him. Patient hasn't seen a psychiatrist and last checked out of a rehab for cocaine abuse over a month ago. Patient was guarded on assessment and cut off the interview short. Patient's living situation is unclear as he is no longer able to stay at his girlfriend's place. Patient may need to stay inpatient and restart his psych medications. Patient has a past psychiatric history of bipolar disorder, depression, schizophrenia, multiple inpatient admissions at [facility] presents to the ED, self referred after being off his medications for over 3 weeks, hearing noises and voices. Pt states he is agitated and endorses HI against the unspecified people out to get him. Patient endorses hearing voices, and persecutory delusional thoughts.  Patient hasn't seen a psychiatrist and last checked out of a rehab for cocaine abuse over a month ago. Patient was guarded on assessment and cut off the interview short. Patient's living situation is unclear as he is no longer able to stay at his girlfriend's place. Patient unable to function and risk to other and requires psychiatric hospitalization for safety and stabilization.

## 2022-12-06 NOTE — ED PROVIDER NOTE - OBJECTIVE STATEMENT
62-year-old male with history of paranoid schizophrenia, hypertension presents to the ED complaining of hallucinations.  Patient states that he has not taken his normal psychiatric medication for the past 3 weeks.  Now experiencing hallucinations.  Cannot understand what the voices are saying but knows that he wants to hurt the voices in his head.  Otherwise denying suicidal ideation, homicidal ideation.  Patient presenting with high blood pressure, also has not been taking his hypertension medication.  Denies chest pain, shortness of breath, lower extremity edema, nausea vomiting headache, vision changes.

## 2022-12-06 NOTE — CHART NOTE - NSCHARTNOTEFT_GEN_A_CORE
SW Note: Notified by  provider that the plan is to transfer pt for inpt psychiatric care. labs completed, covid neg. Referral made to [facility], pending MD review. SW will follow

## 2022-12-06 NOTE — ED ADULT NURSE NOTE - OBJECTIVE STATEMENT
Pt presents with +ah pt states he wants to be admitted to psych for the voices. denies si/hi denies recent drug use. Pt states he has been out of his psych meds for the last 2 weeks. aao x4

## 2022-12-06 NOTE — ED BEHAVIORAL HEALTH ASSESSMENT NOTE - CURRENTLY ENROLLED STUDENT
This visit is being performed virtually via Video visit using [employer].   Clinical Location: [facility] Erich Gutierrez's location Home and is physically present in   the [facility] [place] at the time of this visit.     Patient ID: Brenda Polanco is a 58 year old female.    Chief Complaint   Patient presents with   • Video Visit         HPI: Patient here to fill out paper work. No new complaints    Past Medical History:   Diagnosis Date   • Alcohol dependence in remission (CMS/HCC)    • Anxiety    • Cervical sprain    • Depression    • Essential (primary) hypertension    • Gastroesophageal reflux disease    • High cholesterol    • Low back pain    • Multiple sclerosis (CMS/HCC)    • Polysubstance abuse (CMS/HCC)    • Urinary tract infection      Past Surgical History:   Procedure Laterality Date   • Foot surgery     • Shoulder surg proc unlisted     • Tonsillectomy       Social History     Socioeconomic History   • Marital status: Single     Spouse name: Not on file   • Number of children: Not on file   • Years of education: Not on file   • Highest education level: Not on file   Occupational History   • Occupation: disabled     Comment: [employer]   Tobacco Use   • Smoking status: Current Every Day Smoker     Packs/day: 0.50     Years: 42.00     Pack years: 21.00   • Smokeless tobacco: Never Used   Vaping Use   • Vaping Use: never used   Substance and Sexual Activity   • Alcohol use: Not Currently     Comment: quit 9 years ago   • Drug use: Yes     Types: Marijuana, Prescription Drugs     Comment: daily   • Sexual activity: Not Currently   Other Topics Concern   • Not on file   Social History Narrative   • Not on file     Social Determinants of Health     Financial Resource Strain: Low Risk    • Social Determinants: Financial Resource Strain: Not on file   Food Insecurity: Food Insecurity Present   • Social Determinants: Food Insecurity: Sometimes   Transportation Needs: Unmet Transportation  Needs   • Lack of Transportation (Medical): Yes   • Lack of Transportation (Non-Medical): No   Physical Activity: Inactive   • Days of Exercise per Week: 0 days   • Minutes of Exercise per Session: 0 min   Stress: Medium Risk   • Social Determinants: Stress: Somewhat   Social Connections: Socially Isolated   • Social Determinants: Social Connections: Less than once a week   Intimate Partner Violence: At Risk   • Social Determinants: Intimate Partner Violence Past Fear: Yes   • Social Determinants: Intimate Partner Violence Current Fear: No     Family History   Problem Relation Age of Onset   • Cancer, Breast Mother    • Dementia/Alzheimers Mother    • Heart disease Father    • Hypertension Father    • Patient is unaware of any medical problems Sister    • Bipolar disorder Brother    • Schizophrenia Brother    • Patient is unaware of any medical problems Maternal Grandmother    • Patient is unaware of any medical problems Maternal Grandfather    • Patient is unaware of any medical problems Paternal Grandmother    • Myocardial Infarction Paternal Grandfather      Current Outpatient Medications   Medication Sig Dispense Refill   • nitroGLYCERIN (NITROSTAT) 0.4 MG sublingual tablet Place 1 tablet under the tongue every 5 minutes as needed for Chest pain. Please dispense one bottle, as allowed by insurance 60 tablet 0   • diclofenac (VOLTAREN) 1 % gel      • isosorbide dinitrate (ISORDIL) 10 MG tablet Take 1 tablet by mouth in the morning and 1 tablet in the evening. 60 tablet 3   • losartan (COZAAR) 50 MG tablet Take 0.5 tablets by mouth daily. 90 tablet 3   • clopidogrel (PLAVIX) 75 MG tablet Take 1 tablet by mouth daily. 90 tablet 1   • metoPROLOL succinate (TOPROL-XL) 25 MG 24 hr tablet Take 1 tablet by mouth daily. 90 tablet 3   • atorvastatin (LIPITOR) 40 MG tablet Take 1 tablet by mouth nightly. 90 tablet 3   • aspirin (ECOTRIN) 81 MG EC tablet Take 1 tablet by mouth daily. 30 tablet      No current  facility-administered medications for this visit.     ALLERGIES:  No Known Allergies    Review of Systems   Constitutional: Negative.    HENT: Negative.    Eyes: Negative.    Respiratory: Negative.    Cardiovascular: Negative.    Gastrointestinal: Negative.    Endocrine: Negative.    Genitourinary: Negative.    Musculoskeletal: Positive for arthralgias, back pain and gait problem.   Allergic/Immunologic: Negative.    Hematological: Negative.    Psychiatric/Behavioral: Negative.        There were no vitals taken for this visit.  Physical Exam    CTA NECK W CONTRAST  Narrative: EXAM:  CTA NECK W CONTRAST    CLINICAL INDICATION: Carotid stenosis.  Coronary artery disease.     COMPARISON:  No previous exams available for review.    TECHNIQUE: Helical CTA of the neck obtained after IV administration of 75  mL Omnipaque 350 contrast via left antecubital access at 4.0 mL/s.   Reconstructed 2-D axial, sagittal and coronal MIP images as well as 3-D  serve shaded rotational MIP images obtained at an independent workstation  under concurrent supervision. mA and/or kVp was adjusted for patient size.     FINDINGS:  Mild to moderate calcified atherosclerotic plaque bilateral  carotid and proximal internal carotid arteries.  Measured stenosis 44% on  the right and 40% on the left.  Bilateral vertebral arteries are patent.   No significant soft tissue adenopathy.    Nasopharynx, oropharynx and subglottic airway are patent.  Prevertebral  soft tissues are normal.    Visualized portions of the lung apices are clear.  Impression: 1.  Mild to moderate bilateral carotid bulb and proximal internal carotid  artery calcified plaque without evidence for hemodynamically significant  stenosis.  Measured stenosis at the proximal internal carotid arteries is  44% on the right and 40% on the left.     Electronically Signed by: KNEZIE AMEZQUITA M.D.   Signed on: 8/24/2022 1:42 PM   Cath/PV Case  Selective coronaries:    1.  Left main:  Precontrast  calcification noted.  Ectatic left main branch into an LAD and   circumflex.  2.  Left anterior descending artery:  Stage III vessel, very serpentine as it courses along the anterior wall at   the bifurcation of the D1 there is a 70 to 75% lesion that extends with   the proximal portion D1 otherwise the remaining segments are open without   flow-limiting disease and good targets for grafting.  3.  Left circumflex artery:  Dominant vessel totally occluded at its midsection fills and distally   through collaterals of the LAD and septal perforators and extensive   Thebesian circulation that not only gives applied to the distal OM 3 into   the circumflex proper but also fills the LV.  4.  Right coronary artery:  Codominant system small caliber lesion with a 98% mid lesion.  With   excellent graft targets distally.  Significant catheter dampening noted.    With injection into the left system, noted extensive Thebesian circulation   filling the entire anterior wall with flow into the endocardium and left   ventricle.  The same circulation also fills the distal circumflex.    Cardiothoracic surgery consulted      No results found for this visit on 12/01/22.     Assessment/Plan:  Administrative encounter    - paper work filled out  - F/U with specialist  - medication as prescribed  - return immediately if problems  New Prescriptions    No medications on file         Jayshree Lee MD   No

## 2022-12-06 NOTE — ED PROVIDER NOTE - CLINICAL SUMMARY MEDICAL DECISION MAKING FREE TEXT BOX
Patient with positive psychiatric history presenting with auditory hallucinations.  Patient has not been compliant with medications at home.  Requesting a psychiatric eval and believes that he should be admitted to a psychiatric facility.  Patient is vitally stable in the ED.  Will order labs and consult . Home medications given for BP control.
Spine appears normal, movement of extremities grossly intact.

## 2022-12-06 NOTE — ED ADULT NURSE REASSESSMENT NOTE - NS ED NURSE REASSESS COMMENT FT1
Pt transferred to  in yellow gown, report given to Receiving RN
awake and alert, requesting another meal which was provided. pt educated on the plan of care for inpatient tx. No attempts to harm self or others.
Patient ate food items provided and PO fluids.  Patient resting in bed with no distress.  No attempts to harm self or others and safety maintained.
Patient presented in  area dressed in yellow gowns.  Patient is awake and alert.  Patient mood is subdued and endorses experiencing auditory hallucinations of mumbled sounds.  Patient reports he is hungry.  Patient oriented to staff and educated about plan of care.  Patient cooperative with metal detection.  Patient ambulated to bathroom with a steady gait and provided urine for testing.  Patient provided breakfast tray and ate 100% of his meal.  No attempts to harm self or others and safety maintained.

## 2022-12-06 NOTE — ED PROVIDER NOTE - ATTENDING CONTRIBUTION TO CARE
I personally saw the patient with the resident, and completed the key components of the history and physical exam. I then discussed the management plan with the resident.    61 y/o M with PMH paranoid schizophrenia presents for hallucinations after not taking any of his medication for the past few weeks.    I agree with exam as documented.    Will medically clear for psych evaluation and treatment.

## 2022-12-07 NOTE — CHART NOTE - NSCHARTNOTEFT_GEN_A_CORE
SWNote: p/c to pt's insurance to request auth for transfer. Spoke with representative, clinical info shared and after more than an hour reference# [id number] ,referred to advocate Dayanara GAN for further info... after another 10 min days and  name given four days (12/6 to 12/9 with Elisa CURRAN at [phone]  ext [phone] . Per Dayanara, auth not  active until [facility] calls Elisa and confirms pt at [facility] . Worker will inform [facility] (Caridad) . E mail will be sent asap.

## 2023-01-09 NOTE — ED ADULT NURSE NOTE - CAS TRG GEN SKIN CONDITION
Warm Hydroxyzine Pregnancy And Lactation Text: This medication is not safe during pregnancy and should not be taken. It is also excreted in breast milk and breast feeding isn't recommended.

## 2023-02-03 ENCOUNTER — EMERGENCY (EMERGENCY)
Facility: HOSPITAL | Age: 63
LOS: 1 days | Discharge: HOME | End: 2023-02-03
Attending: STUDENT IN AN ORGANIZED HEALTH CARE EDUCATION/TRAINING PROGRAM
Payer: MEDICAID

## 2023-02-03 VITALS
TEMPERATURE: 98 F | OXYGEN SATURATION: 95 % | SYSTOLIC BLOOD PRESSURE: 195 MMHG | DIASTOLIC BLOOD PRESSURE: 115 MMHG | RESPIRATION RATE: 18 BRPM | HEART RATE: 90 BPM | HEIGHT: 72 IN

## 2023-02-03 VITALS
RESPIRATION RATE: 17 BRPM | SYSTOLIC BLOOD PRESSURE: 145 MMHG | HEART RATE: 87 BPM | DIASTOLIC BLOOD PRESSURE: 85 MMHG | OXYGEN SATURATION: 97 %

## 2023-02-03 DIAGNOSIS — Z96.652 PRESENCE OF LEFT ARTIFICIAL KNEE JOINT: Chronic | ICD-10-CM

## 2023-02-03 DIAGNOSIS — Z98.89 OTHER SPECIFIED POSTPROCEDURAL STATES: Chronic | ICD-10-CM

## 2023-02-03 PROCEDURE — 93005 ELECTROCARDIOGRAM TRACING: CPT

## 2023-02-03 PROCEDURE — 93010 ELECTROCARDIOGRAM REPORT: CPT

## 2023-02-03 PROCEDURE — 99284 EMERGENCY DEPT VISIT MOD MDM: CPT

## 2023-02-03 PROCEDURE — 99283 EMERGENCY DEPT VISIT LOW MDM: CPT

## 2023-02-03 RX ORDER — AMLODIPINE BESYLATE 2.5 MG/1
10 TABLET ORAL ONCE
Refills: 0 | Status: DISCONTINUED | OUTPATIENT
Start: 2023-02-03 | End: 2023-02-11

## 2023-02-03 RX ORDER — HYDROCHLOROTHIAZIDE 25 MG
1 TABLET ORAL
Qty: 14 | Refills: 0
Start: 2023-02-03 | End: 2023-02-16

## 2023-02-03 RX ORDER — AMLODIPINE BESYLATE 2.5 MG/1
1 TABLET ORAL
Qty: 14 | Refills: 0
Start: 2023-02-03 | End: 2023-02-16

## 2023-02-03 NOTE — ED PROVIDER NOTE - NS ED ROS FT
Constitutional: no fever, no chills  Head: NC, AT   Eyes: no redness   ENMT: no nasal congestion/drainage, no sore throat   CV: no chest pain, no edema  Resp: no cough, no dyspnea  GI: no abdominal pain, no nausea, no vomiting, no diarrhea  : no dysuria, no hematuria   Skin: no lesions, no rashes   Neuro: no LOC, no headache, no sensory deficits, no weakness Constitutional: no fever, no chills  Head: NC, AT   Eyes: no redness, +spots in vision  ENMT: no nasal congestion/drainage, no sore throat   CV: no chest pain, no edema  Resp: no cough, no dyspnea  GI: no abdominal pain, no nausea, no vomiting, no diarrhea  : no dysuria, no hematuria   Skin: no lesions, no rashes   Neuro: no LOC, no headache, no sensory deficits, no weakness

## 2023-02-03 NOTE — ED PROVIDER NOTE - NSICDXPASTMEDICALHX_GEN_ALL_CORE_FT
PAST MEDICAL HISTORY:  Anxiety     Asthma (mild) never had a attach recently, use the puffs only when needed    Bipolar disorder     Chronic sinusitis     Depression     High cholesterol     HTN (hypertension)     Nasal polyps     Schizophrenia

## 2023-02-03 NOTE — ED PROVIDER NOTE - CLINICAL SUMMARY MEDICAL DECISION MAKING FREE TEXT BOX
Pt specifically denying psych disturbances. No external signs of hallucinations. No need for psych consult. Will control BP and get labs, ekg. Pt specifically denying psych disturbances. No external signs of hallucinations. No need for psych consult. Will control BP via home meds and get screening labs to assess Cr/ekg. will send home meds to [facility]. no indication for imaging or acute iv bp reduction at this time Pt specifically denying psych disturbances. No external signs of hallucinations. No need for psych consult. Will control BP via home meds and get screening labs to assess Cr/ekg. will send home meds to [facility]. no indication for imaging or acute iv bp reduction at this time    patient eloped before labs/meds could be performed.  BP improved without interventions. medication refill sent to pharmacy. no psychiatric or saftey cocncerns at this time

## 2023-02-03 NOTE — ED PROVIDER NOTE - PHYSICAL EXAMINATION
General: well appearing, NAD  Head: NC, AT  EENT: EOMI, no scleral icterus  Cardiac: RRR, no apparent murmurs, no lower extremity edema  Respiratory: CTABL, no respiratory distress   Abdomen: soft, ND, NT, nonperitonitic  MSK/Vascular: full ROM, soft compartments, warm extremities  Neuro: AAOx3, sensation to light touch intact  Psych: calm, cooperative, not reacting to external stimuli General: well appearing, NAD  Head: NC, AT  EENT: EOMI, no scleral icterus  Cardiac: RRR, no apparent murmurs, no lower extremity edema  Respiratory: CTABL, no respiratory distress   Abdomen: soft, ND, NT, nonperitonitic  MSK/Vascular: full ROM, soft compartments, warm extremities  Neuro: Awake, alert, interactive. CN2-12 grossly intact, Strength 5/5 in upper and lower extremities. Sensation intact to light touch in upper and lower extremities. Gait WNL, finger-to-nose WNL.   Psych: calm, cooperative, not reacting to external stimuli General: well appearing, NAD  Head: NC, AT  EENT: EOMI, no scleral icterus  Cardiac: RRR, +R sternal border murmur known to patient, no lower extremity edema  Respiratory: CTABL, no respiratory distress   Abdomen: soft, ND, NT, nonperitonitic  MSK/Vascular: full ROM, soft compartments, warm extremities  Neuro: Awake, alert, interactive. CN2-12 grossly intact, Strength 5/5 in upper and lower extremities. Sensation intact to light touch in upper and lower extremities. Gait WNL, finger-to-nose WNL.   Psych: calm, cooperative, not reacting to external stimuli

## 2023-02-03 NOTE — ED PROVIDER NOTE - PROGRESS NOTE DETAILS
patient eloped before labs/meds could be performed.  BP improved without interventions. medication refill sent to pharmacy. no psychiatric or saftey cocncerns at this time

## 2023-02-03 NOTE — ED ADULT TRIAGE NOTE - CHIEF COMPLAINT QUOTE
patient BIBA from home per EMS for 3 days of auditory hallucinations. upon arrival patient denies auditory or visual hallucinations. denies SI/HI. states he needs HTN medications refilled. admits to drinking "a few beers" earlier today.

## 2023-02-03 NOTE — ED PROVIDER NOTE - OBJECTIVE STATEMENT
62 y m with pmh of schizophrenia and htn brought in by EMS for visual hallucinations. Pt is currently cooperative, denying v/a/t hallucinations, denying SI/HI, cp, sob, n/v, ab pain, dysuria, HA, change in strength or sensation. not reacting to external stimuli. Pt reports that he ran out of BP meds and that his pressure is elevated. 62 y m with pmh of schizophrenia and htn brought in by EMS for visual hallucinations. Pt is currently cooperative, denying v/a/t hallucinations, denying SI/HI, cp, sob, n/v, ab pain, dysuria, HA, change in strength or sensation. not reacting to external stimuli. Pt reports that he ran out of BP meds and that his pressure is elevated, states he see "spots" in his vision when he stands up and this is typical of when his BP is elevated. Drank two beers in this AM, no hx withdrawal, no tobacco or other drug use

## 2023-04-11 ENCOUNTER — NON-APPOINTMENT (OUTPATIENT)
Age: 63
End: 2023-04-11

## 2023-04-20 ENCOUNTER — TRANSCRIPTION ENCOUNTER (OUTPATIENT)
Age: 63
End: 2023-04-20

## 2023-05-07 ENCOUNTER — EMERGENCY (EMERGENCY)
Facility: HOSPITAL | Age: 63
LOS: 1 days | Discharge: HOME | End: 2023-05-07
Payer: MEDICAID

## 2023-05-07 VITALS
TEMPERATURE: 98 F | WEIGHT: 250 LBS | OXYGEN SATURATION: 94 % | HEIGHT: 72 IN | RESPIRATION RATE: 18 BRPM | DIASTOLIC BLOOD PRESSURE: 92 MMHG | HEART RATE: 83 BPM | SYSTOLIC BLOOD PRESSURE: 155 MMHG

## 2023-05-07 DIAGNOSIS — Z98.89 OTHER SPECIFIED POSTPROCEDURAL STATES: Chronic | ICD-10-CM

## 2023-05-07 DIAGNOSIS — Z96.652 PRESENCE OF LEFT ARTIFICIAL KNEE JOINT: Chronic | ICD-10-CM

## 2023-05-07 PROBLEM — F20.9 SCHIZOPHRENIA, UNSPECIFIED: Chronic | Status: ACTIVE | Noted: 2023-02-03

## 2023-05-07 PROCEDURE — 82962 GLUCOSE BLOOD TEST: CPT

## 2023-05-07 PROCEDURE — L9991: CPT

## 2023-05-07 NOTE — ED ADULT TRIAGE NOTE - CHIEF COMPLAINT QUOTE
Pt report his blood pressure is high not able to tell me what it was at home but say its high when he sees spots. Pt reports he took his medicine before coming here because he forgot to take this morning +ETOH and +smoking. Denies chest pain

## 2023-05-15 NOTE — ED PROVIDER NOTE - NSCAREINITIATED _GEN_ER
Reid Montanez(Attending)
Detail Level: Detailed
Instructions (Optional): Disc ok to call for Rx for TR 0.1% cream if flares up

## 2023-08-24 ENCOUNTER — EMERGENCY (EMERGENCY)
Facility: HOSPITAL | Age: 63
LOS: 1 days | Discharge: LEFT BEFORE DISCHARGE | End: 2023-08-24
Payer: MEDICAID

## 2023-08-24 VITALS
OXYGEN SATURATION: 99 % | HEART RATE: 65 BPM | WEIGHT: 240.08 LBS | RESPIRATION RATE: 18 BRPM | DIASTOLIC BLOOD PRESSURE: 111 MMHG | HEIGHT: 72 IN | TEMPERATURE: 98 F | SYSTOLIC BLOOD PRESSURE: 211 MMHG

## 2023-08-24 DIAGNOSIS — Z98.89 OTHER SPECIFIED POSTPROCEDURAL STATES: Chronic | ICD-10-CM

## 2023-08-24 DIAGNOSIS — Z96.652 PRESENCE OF LEFT ARTIFICIAL KNEE JOINT: Chronic | ICD-10-CM

## 2023-08-24 PROCEDURE — 93010 ELECTROCARDIOGRAM REPORT: CPT

## 2023-08-24 PROCEDURE — L9991: CPT

## 2023-08-24 PROCEDURE — 93005 ELECTROCARDIOGRAM TRACING: CPT

## 2023-08-24 NOTE — ED ADULT TRIAGE NOTE - CHIEF COMPLAINT QUOTE
pt c/o RUQ pain, went to PMD and was told that he had an abnormal EKG and was sent here for further evaluation.

## 2023-09-14 NOTE — ED ADULT NURSE NOTE - PSH
lower leg pain/injury
S/P exploratory laparotomy  (2011)  S/P hernia surgery  (2014)  S/P sinus surgery    Status post total left knee replacement

## 2024-03-29 ENCOUNTER — EMERGENCY (EMERGENCY)
Facility: HOSPITAL | Age: 64
LOS: 1 days | Discharge: HOME | End: 2024-03-29
Attending: EMERGENCY MEDICINE
Payer: MEDICAID

## 2024-03-29 VITALS
DIASTOLIC BLOOD PRESSURE: 82 MMHG | HEART RATE: 73 BPM | SYSTOLIC BLOOD PRESSURE: 140 MMHG | TEMPERATURE: 98 F | WEIGHT: 240.3 LBS | OXYGEN SATURATION: 96 % | RESPIRATION RATE: 16 BRPM

## 2024-03-29 DIAGNOSIS — Z96.652 PRESENCE OF LEFT ARTIFICIAL KNEE JOINT: Chronic | ICD-10-CM

## 2024-03-29 DIAGNOSIS — Z98.89 OTHER SPECIFIED POSTPROCEDURAL STATES: Chronic | ICD-10-CM

## 2024-03-29 DIAGNOSIS — F32.A DEPRESSION, UNSPECIFIED: ICD-10-CM

## 2024-03-29 DIAGNOSIS — F14.10 COCAINE ABUSE, UNCOMPLICATED: ICD-10-CM

## 2024-03-29 LAB
ANION GAP SERPL CALC-SCNC: 10 MMOL/L — SIGNIFICANT CHANGE UP (ref 5–17)
APAP SERPL-MCNC: <3 UG/ML — LOW (ref 10–26)
BUN SERPL-MCNC: 22.5 MG/DL — HIGH (ref 8–20)
CALCIUM SERPL-MCNC: 9.1 MG/DL — SIGNIFICANT CHANGE UP (ref 8.4–10.5)
CHLORIDE SERPL-SCNC: 103 MMOL/L — SIGNIFICANT CHANGE UP (ref 96–108)
CO2 SERPL-SCNC: 25 MMOL/L — SIGNIFICANT CHANGE UP (ref 22–29)
CREAT SERPL-MCNC: 0.74 MG/DL — SIGNIFICANT CHANGE UP (ref 0.5–1.3)
EGFR: 102 ML/MIN/1.73M2 — SIGNIFICANT CHANGE UP
ETHANOL SERPL-MCNC: <10 MG/DL — SIGNIFICANT CHANGE UP (ref 0–9)
FLUAV AG NPH QL: SIGNIFICANT CHANGE UP
FLUBV AG NPH QL: SIGNIFICANT CHANGE UP
GLUCOSE SERPL-MCNC: 118 MG/DL — HIGH (ref 70–99)
HCT VFR BLD CALC: 41.9 % — SIGNIFICANT CHANGE UP (ref 39–50)
HGB BLD-MCNC: 13.7 G/DL — SIGNIFICANT CHANGE UP (ref 13–17)
MCHC RBC-ENTMCNC: 28.7 PG — SIGNIFICANT CHANGE UP (ref 27–34)
MCHC RBC-ENTMCNC: 32.7 GM/DL — SIGNIFICANT CHANGE UP (ref 32–36)
MCV RBC AUTO: 87.8 FL — SIGNIFICANT CHANGE UP (ref 80–100)
PLATELET # BLD AUTO: 287 K/UL — SIGNIFICANT CHANGE UP (ref 150–400)
POTASSIUM SERPL-MCNC: 4.1 MMOL/L — SIGNIFICANT CHANGE UP (ref 3.5–5.3)
POTASSIUM SERPL-SCNC: 4.1 MMOL/L — SIGNIFICANT CHANGE UP (ref 3.5–5.3)
RBC # BLD: 4.77 M/UL — SIGNIFICANT CHANGE UP (ref 4.2–5.8)
RBC # FLD: 12.8 % — SIGNIFICANT CHANGE UP (ref 10.3–14.5)
RSV RNA NPH QL NAA+NON-PROBE: SIGNIFICANT CHANGE UP
SALICYLATES SERPL-MCNC: <0.6 MG/DL — LOW (ref 10–20)
SARS-COV-2 RNA SPEC QL NAA+PROBE: SIGNIFICANT CHANGE UP
SODIUM SERPL-SCNC: 138 MMOL/L — SIGNIFICANT CHANGE UP (ref 135–145)
WBC # BLD: 8.35 K/UL — SIGNIFICANT CHANGE UP (ref 3.8–10.5)
WBC # FLD AUTO: 8.35 K/UL — SIGNIFICANT CHANGE UP (ref 3.8–10.5)

## 2024-03-29 PROCEDURE — 99223 1ST HOSP IP/OBS HIGH 75: CPT

## 2024-03-29 PROCEDURE — 93010 ELECTROCARDIOGRAM REPORT: CPT

## 2024-03-29 PROCEDURE — 90792 PSYCH DIAG EVAL W/MED SRVCS: CPT

## 2024-03-29 NOTE — ED BEHAVIORAL HEALTH ASSESSMENT NOTE - OTHER PAST PSYCHIATRIC HISTORY (INCLUDE DETAILS REGARDING ONSET, COURSE OF ILLNESS, INPATIENT/OUTPATIENT TREATMENT)
Patient has a history of multiple short inpatient psychiatric admissions at [facility] > 1 yr ago.

## 2024-03-29 NOTE — ED ADULT NURSE NOTE - NSFALLUNIVINTERV_ED_ALL_ED
Bed/Stretcher in lowest position, wheels locked, appropriate side rails in place/Call bell, personal items and telephone in reach/Instruct patient to call for assistance before getting out of bed/chair/stretcher/Non-slip footwear applied when patient is off stretcher/[place] to call system/Physically safe environment - no spills, clutter or unnecessary equipment/Purposeful proactive rounding/Room/bathroom lighting operational, light cord in reach Monitor gait and stability/Physically safe environment - no spills, clutter or unnecessary equipment

## 2024-03-29 NOTE — ED BEHAVIORAL HEALTH PROGRESS NOTE - RISK ASSESSMENT
Pt reports ongoing depressive sx and vague SI and appears to be crashing from ccn.  Will hold o/n and r/a in the morning s/p metabolization.

## 2024-03-29 NOTE — ED BEHAVIORAL HEALTH PROGRESS NOTE - SUMMARY
63 year old male with PMHx of HTN, homeless < 1 yr currently unemployed, 4 adult children who live out of state, with a past psychiatric history of schizophrenia, bipolar disorder, depression, and multiple past psychiatric admissions unclear when last > 1 yr ago which include  [place], one prior suicide attempt remote by cutting wrist, reports is on meds but has no out Pt provider, PMH HTN off meds bib friend for severe depression and passive SI and homicidal thoughts.    Pt reports depressed, demoralized and is irritable.  Pt c/o inability to find housing, claims he pain taxes and owned a home and is angry about undocumented people  without ID getting more support than he who is a citizen.  Mood is depressed no specific plan or intent to self harm.  States he needs time to regroup.     Pt is asking for psych admission for severe depression.   Hold for reassessment due to recent or possible current intox (tox pending) and further metabolization of substances.

## 2024-03-29 NOTE — ED BEHAVIORAL HEALTH ASSESSMENT NOTE - SUMMARY
63 year old male with PMHx of HTN, homeless < 1 yr currently unemployed, 4 adult children who live out of state, with a past psychiatric history of schizophrenia, bipolar disorder, depression, and multiple past psychiatric admissions unclear when last > 1 yr ago which include  [place], one prior suicide attempt remote by cutting wrist, reports is on meds but has no out Pt provider, PMH HTN off meds bib friend for severe depression and passive SI and homicidal thoughts.    Pt reports depressed, demoralized and is irritable.  Pt c/o inability to find housing, claims he pain taxes and owned a home and is angry about undocumented people  without ID getting more support than he who is a citizen.  Mood is depressed no specific plan or intent to self harm.  States he needs time to regroup.     Pt is asking for psych admission for severe depression.   Hold for reassessment due to recent or possible current intox (tox pending) and further metabolization of substances.

## 2024-03-29 NOTE — ED ADULT TRIAGE NOTE - CHIEF COMPLAINT QUOTE
Ambulatory to ED with c/o SI x 1 week. denies HI. denies plan. hx schizophrenia and bipolar. states he has not taken meds in 3 weeks 2/2 ran out.

## 2024-03-29 NOTE — ED PROVIDER NOTE - ATTENDING CONTRIBUTION TO CARE
I personally saw the patient with the resident, and completed the key components of the history and physical exam. I then discussed the management plan with the resident.     I performed a face to face bedside interview with patient regarding history of present illness, review of symptoms and past medical history. I completed an independent physical exam.  I have discussed patient's plan of care with resident.   I agree with note as stated above including HISTORY OF PRESENT ILLNESS, HIV, PAST MEDICAL/SURGICAL/FAMILY/SOCIAL HISTORY, ALLERGIES AND HOME MEDICATIONS, REVIEW OF SYSTEMS, PHYSICAL EXAM, MEDICAL DECISION MAKING and any PROGRESS NOTES during the time I functioned as the attending physician for this patient unless otherwise noted. My brief assessment is as follows:    63 -year-old gentleman suicidal depressed medically cleared will require inpatient psychiatric evaluation   General alert   respiratory clear   cardiac no murmur   abdomen soft   neuro intact    psych depressed

## 2024-03-29 NOTE — ED BEHAVIORAL HEALTH ASSESSMENT NOTE - RISK ASSESSMENT
RF past suicide attempt, homeless, male, noncompliance, crack cocaine abuse ETOH use, HI  PF denies active SIIP

## 2024-03-29 NOTE — ED BEHAVIORAL HEALTH ASSESSMENT NOTE - HPI (INCLUDE ILLNESS QUALITY, SEVERITY, DURATION, TIMING, CONTEXT, MODIFYING FACTORS, ASSOCIATED SIGNS AND SYMPTOMS)
63 year old male with PMHx of HTN, homeless < 1 yr currently unemployed, 4 adult children who live out of state, with a past psychiatric history of schizophrenia, bipolar disorder, depression, and multiple past psychiatric admissions unclear when last > 1 yr ago which include  [place], one prior suicide attempt remote by cutting wrist, reports is on meds but has no out Pt provider, PMH HTN off meds bib friend for severe depression and passive SI and homicidal thoughts.    Pt reports depressed, demoralized and is irritable.  Pt c/o inability to find housing, claims he pain taxes and owned a home and is angry about undocumented people  without ID getting more support than he who is a citizen.  Mood is depressed no specific plan or intent to self harm.  States he needs time to regroup.  Admits to drug use, minimizes amt, denies regular use, denies alcohol dependence.  States has been staying with friends.  Denies AH/VH.  States he has thoughts of hurting people but denies any specific person or intent.  Speech is dysarthric possible TD.  States he has not had his meds in 3 weeks but could not provide names.  Pt is asking for psych admission for severe depression.  Spoke with former GF Shayna Matta who reports Pt has a problem with drugs for years, specifically crack cocaine and alcohol.  States she allowed him to stay with her for past month and was doing work for her around her house however she said can no longer allow him to stay there due to drug use however claims he needs help.  Shayna reports remote suicide attempt but cutting wrist.

## 2024-03-29 NOTE — ED BEHAVIORAL HEALTH PROGRESS NOTE - DETAILS:
Pt seen and examined by me, chart reviewed, case discussed in interdisciplinary rounds.  Pt reports ongoing depressed mood and vague SI, denies AH currently, though reports he has them chronically I/s/o chronic ccn use.  Denies HI.  Continues to request vol admission.  Appears to be crashing from ccn.

## 2024-03-29 NOTE — ED BEHAVIORAL HEALTH ASSESSMENT NOTE - CURRENT MEDICATION
not filled in one yr:  amlodipine 10 mg  HCTZ 25mg  Risperdal 1 mg bid # 18 in Feb and 28 in Jan 2024 prescribed by SILVANA Gaxiola   (attempted contact but no answer)

## 2024-03-29 NOTE — ED PROVIDER NOTE - OBJECTIVE STATEMENT
63-year-old male patient with past medical history of bipolar and schizophrenia presenting to ED with suicidal ideation for 1 week.  Reports he has not been able to take his medication for 2 weeks because he ran out.  Denies hallucinations, homicidal ideation, or any other complaints.

## 2024-03-29 NOTE — ED PROVIDER NOTE - CLINICAL SUMMARY MEDICAL DECISION MAKING FREE TEXT BOX
Patient with past medical history of bipolar and schizophrenia presenting to ED with suicidal ideation.  Psych clearance labs ordered.  Patient placed in constant observation and will be evaluated by psychiatry.

## 2024-03-29 NOTE — ED BEHAVIORAL HEALTH ASSESSMENT NOTE - VIOLENCE RISK FACTORS:
Violent ideation/threat/speech/Substance abuse/Noncompliance with treatment/Community stressors that increase the risk of destabilization/Irritability

## 2024-03-29 NOTE — ED CDU PROVIDER INITIAL DAY NOTE - CHIEF COMPLAINT
Targets for pregnant women with diabetes:  A1c <6.0%  Fasting BG <95  1hr post-prandial <140  2hr post-prandial <120    Thank you for allowing us to take care of you today! Please call the clinic 1 week after having any blood tests or imaging to find out your results. You may receive a survey after your visit today. We would appreciate your feedback! The patient is a 63y Male complaining of suicidal thoughts.

## 2024-03-29 NOTE — ED PROVIDER NOTE - PHYSICAL EXAMINATION
Gen: Well appearing in NAD  Head: NC/AT  Neck: trachea midline  Card: regular rate and rhythm  Resp:  CTAB  Abd: soft, non-distended, non-tender  Ext: no deformities above reported baseline  Neuro:  A&O, no motor or sensory deficits above reported baseline  Skin:  Warm and dry as visualized  Psych:  depressed, SI

## 2024-03-29 NOTE — ED BEHAVIORAL HEALTH ASSESSMENT NOTE - DESCRIPTION
homeless > 1 yr HTN T(C): 36.7 (03-29-24 @ 10:36), Max: 36.7 (03-29-24 @ 10:36)  T(F): 98.1 (03-29-24 @ 10:36), Max: 98.1 (03-29-24 @ 10:36)  HR: 84 (03-29-24 @ 10:36) (56 - 84)  BP: 114/65 (03-29-24 @ 10:36) (114/65 - 146/89)  RR: 18 (03-29-24 @ 10:36) (16 - 18)  SpO2: 96% (03-29-24 @ 10:36) (96% - 97%)

## 2024-03-29 NOTE — ED ADULT NURSE NOTE - OBJECTIVE STATEMENT
pt states he has psych hx, presents w/ SI x1 week and hasn't been able to take his meds. denies HI. pt placed in yellow gown, belongings secured & wanded pt states he has psych hx, presents w/ SI x1 week and hasn't been able to take his meds. denies HI. multiple attempts made by multiple staff members to have pt change into yellow gown, however pt refused.  made aware

## 2024-03-30 PROCEDURE — 99231 SBSQ HOSP IP/OBS SF/LOW 25: CPT

## 2024-03-30 NOTE — ED ADULT NURSE REASSESSMENT NOTE - GENERAL PATIENT STATE
comfortable appearance/resting/sleeping

## 2024-03-31 VITALS
DIASTOLIC BLOOD PRESSURE: 83 MMHG | OXYGEN SATURATION: 100 % | HEART RATE: 61 BPM | SYSTOLIC BLOOD PRESSURE: 138 MMHG | RESPIRATION RATE: 18 BRPM | TEMPERATURE: 98 F

## 2024-03-31 LAB
AMPHET UR-MCNC: NEGATIVE — SIGNIFICANT CHANGE UP
APPEARANCE UR: CLEAR — SIGNIFICANT CHANGE UP
BACTERIA # UR AUTO: ABNORMAL /HPF
BARBITURATES UR SCN-MCNC: NEGATIVE — SIGNIFICANT CHANGE UP
BENZODIAZ UR-MCNC: NEGATIVE — SIGNIFICANT CHANGE UP
BILIRUB UR-MCNC: NEGATIVE — SIGNIFICANT CHANGE UP
CAST: 3 /LPF — SIGNIFICANT CHANGE UP (ref 0–4)
COCAINE METAB.OTHER UR-MCNC: POSITIVE
COLOR SPEC: YELLOW — SIGNIFICANT CHANGE UP
DIFF PNL FLD: NEGATIVE — SIGNIFICANT CHANGE UP
GLUCOSE UR QL: NEGATIVE MG/DL — SIGNIFICANT CHANGE UP
KETONES UR-MCNC: ABNORMAL MG/DL
LEUKOCYTE ESTERASE UR-ACNC: ABNORMAL
METHADONE UR-MCNC: NEGATIVE — SIGNIFICANT CHANGE UP
NITRITE UR-MCNC: NEGATIVE — SIGNIFICANT CHANGE UP
OPIATES UR-MCNC: NEGATIVE — SIGNIFICANT CHANGE UP
PCP SPEC-MCNC: SIGNIFICANT CHANGE UP
PCP UR-MCNC: NEGATIVE — SIGNIFICANT CHANGE UP
PH UR: 7 — SIGNIFICANT CHANGE UP (ref 5–8)
PROT UR-MCNC: NEGATIVE MG/DL — SIGNIFICANT CHANGE UP
RBC CASTS # UR COMP ASSIST: 1 /HPF — SIGNIFICANT CHANGE UP (ref 0–4)
SP GR SPEC: 1.02 — SIGNIFICANT CHANGE UP (ref 1–1.03)
SQUAMOUS # UR AUTO: 3 /HPF — SIGNIFICANT CHANGE UP (ref 0–5)
THC UR QL: NEGATIVE — SIGNIFICANT CHANGE UP
UROBILINOGEN FLD QL: 1 MG/DL — SIGNIFICANT CHANGE UP (ref 0.2–1)
WBC UR QL: 63 /HPF — HIGH (ref 0–5)

## 2024-03-31 PROCEDURE — G0378: CPT

## 2024-03-31 PROCEDURE — 85027 COMPLETE CBC AUTOMATED: CPT

## 2024-03-31 PROCEDURE — 99282 EMERGENCY DEPT VISIT SF MDM: CPT

## 2024-03-31 PROCEDURE — 81001 URINALYSIS AUTO W/SCOPE: CPT

## 2024-03-31 PROCEDURE — 93005 ELECTROCARDIOGRAM TRACING: CPT

## 2024-03-31 PROCEDURE — 99238 HOSP IP/OBS DSCHRG MGMT 30/<: CPT

## 2024-03-31 PROCEDURE — 36415 COLL VENOUS BLD VENIPUNCTURE: CPT

## 2024-03-31 PROCEDURE — 80307 DRUG TEST PRSMV CHEM ANLYZR: CPT

## 2024-03-31 PROCEDURE — 99285 EMERGENCY DEPT VISIT HI MDM: CPT | Mod: 25

## 2024-03-31 PROCEDURE — 80048 BASIC METABOLIC PNL TOTAL CA: CPT

## 2024-03-31 PROCEDURE — 85025 COMPLETE CBC W/AUTO DIFF WBC: CPT

## 2024-03-31 PROCEDURE — 87637 SARSCOV2&INF A&B&RSV AMP PRB: CPT

## 2024-03-31 NOTE — ED CDU PROVIDER SUBSEQUENT DAY NOTE - PHYSICAL EXAMINATION
Gen: Well appearing in NAD  Head: NC/AT  Neck: trachea midline  Card: regular rate and rhythm  Resp:  CTAB  Abd: soft, non-distended, non-tender  Ext: no deformities above reported baseline  Neuro:  A&O, no motor or sensory deficits above reported baseline  Skin:  Warm and dry as visualized  Psych:  depressed, SI
Gen: Well appearing in NAD  Head: NC/AT  Neck: trachea midline  Card: regular rate and rhythm  Resp:  CTAB  Abd: soft, non-distended, non-tender  Ext: no deformities above reported baseline  Neuro:  A&O, no motor or sensory deficits above reported baseline  Skin:  Warm and dry as visualized  Psych:  depressed, SI

## 2024-03-31 NOTE — ED CDU PROVIDER SUBSEQUENT DAY NOTE - CROS ED ROS STATEMENT
"Asked to see for very large hematoma right groin and adjacent areas.  Femostop applied but off now.  Hemodynamics \"OK\".    Needs angiographic assessment.  " all other ROS negative except as per HPI

## 2024-03-31 NOTE — ED ADULT NURSE REASSESSMENT NOTE - NS ED NURSE REASSESS COMMENT FT1
Assumed patient care at 7::00 PM. Patient in the community area requesting food. On rounds, patient barely responds to questions, stating he is not in a mood to talk. Patient is waiting for reassessment in AM.
Patient in bed sleeping, urine collected safety maintained.
Patient made aware of needs of urine to complete admission/ transfer, patient verbalized understanding, awaiting for urine from patient.
received report received pt sleeping. color good resp even unlabored.  no harm to self or others.
Patient has been self isolating to his room, out to use bathroom but has yet to provide urine specimen despite staff education. Pt with limited participation in treatment plan when MD attempts to further assess, states he's "really tired", and returns to sleep. Awake for meals, but continues to minimally answer questions. MD to reassess tomorrow for potential d/c if pt unwilling to participate in care plan.

## 2024-03-31 NOTE — ED CDU PROVIDER DISPOSITION NOTE - PATIENT PORTAL LINK FT
You can access the FollowMyHealth Patient Portal offered by [facility] by registering at the following website: [email or web address]. By joining [employer]’s FollowMyHealth portal, you will also be able to view your health information using other applications (apps) compatible with our system.

## 2024-03-31 NOTE — ED BEHAVIORAL HEALTH NOTE - BEHAVIORAL HEALTH NOTE
Patient was seen today AM, chart reviewed and discussed in team. Patient has  been in ED for past 3 days, no issues, no aggression or agitation reported. Mood in control, overall stable. He was not given any meds. U-tox was positive for cocaine. He refused to got to DSS ,later he called somebody and agreed to go home.    MSE:  Mental Status Exam:  · Level of Consciousness	Lethargic, arousable to verbal stimulus  · General Appearance	No deformities present  · Body Habitus	Average build  · Hygiene	Fair  · Grooming	Fair  · Behavior	Cooperative  · Eye Contact	Fair  · Relatedness	Fair  · Impulse Control	Normal  · Muscle Tone/Strength	Normal muscle tone/strength  · Abnormal Movements	Other  · Other	mouth movements TD/dystonia  · Gait/Station	Unable to assess  · Speech	Normal volume, rate, productivity, spontaneity and articulation  · Mood	Depressed, Irritable  · Affect Quality	Depressed, Irritable  · Affect Range	Constricted  · Affect Congruence	Congruent  · Thought Process	Linear, Impaired reasoning  · Thought Associations	Normal  · Thought Content	Unremarkable, Hopelessness  · Perceptions	No abnormalities  · Orientation	Oriented to time, place, person, situation  · Attention/Concentration	Impaired  · Estimated Intelligence	Average  · Recent Memory	Normal  · Remote Memory	Normal  · Fund of Knowledge	Normal  · How Fund of Knowledge Assessed	Vocabulary  · Language	No abnormalities noted  · Judgment	Fair  · Insight	Fair    Labs: CBC-WNL           CMP-WNL          U-Tox--Positive for Cocaine    Plan: Patient to be discharged to a friend/family house.          No meds         Refused after care

## 2024-04-02 DIAGNOSIS — F31.9 BIPOLAR DISORDER, UNSPECIFIED: ICD-10-CM

## 2024-04-02 DIAGNOSIS — T43.206A UNDERDOSING OF UNSPECIFIED ANTIDEPRESSANTS, INITIAL ENCOUNTER: ICD-10-CM

## 2024-04-02 DIAGNOSIS — Z59.00 HOMELESSNESS UNSPECIFIED: ICD-10-CM

## 2024-04-02 DIAGNOSIS — F14.10 COCAINE ABUSE, UNCOMPLICATED: ICD-10-CM

## 2024-04-02 DIAGNOSIS — Z20.822 CONTACT WITH AND (SUSPECTED) EXPOSURE TO COVID-19: ICD-10-CM

## 2024-04-02 DIAGNOSIS — Z91.128 PATIENT'S INTENTIONAL UNDERDOSING OF MEDICATION REGIMEN FOR OTHER REASON: ICD-10-CM

## 2024-04-02 DIAGNOSIS — R45.851 SUICIDAL IDEATIONS: ICD-10-CM

## 2024-04-02 DIAGNOSIS — F20.9 SCHIZOPHRENIA, UNSPECIFIED: ICD-10-CM

## 2024-04-02 DIAGNOSIS — Z91.51 PERSONAL HISTORY OF SUICIDAL BEHAVIOR: ICD-10-CM

## 2024-04-02 DIAGNOSIS — I10 ESSENTIAL (PRIMARY) HYPERTENSION: ICD-10-CM

## 2024-04-02 SDOH — ECONOMIC STABILITY - HOUSING INSECURITY: HOMELESSNESS UNSPECIFIED: Z59.00

## 2024-07-08 NOTE — ED BEHAVIORAL HEALTH ASSESSMENT NOTE - NSSUICPROTFACT_PSY_ALL_CORE
-- DO NOT REPLY / DO NOT REPLY ALL --  -- This inbox is not monitored  -- Message is from Engagement Center Operations (ECO) --      Message Type:  Refill Medication   Refill request for medication named: ibuprofen & see pended med  Medication not pended: Medication is not on current med list.  Preferred pharmacy verified and selected.     [id number] - [place], [place] - [address] [facility] [address]  [address] [facility] [facility] [address] [address]  Phone: [phone] Fax: [phone]      Is the patient OUT of Medication?  No        Message: Ibuprofen new script                Responsibility to children, family, or others/Identifies reasons for living/Supportive social network of family or friends

## 2024-12-04 NOTE — ED BEHAVIORAL HEALTH ASSESSMENT NOTE - OTHER
Discussed dietary and exercise recommendations.  Given handout on Mediterranean diet.  Do think the patient would benefit from seeing weight management clinic and discussed possibility of    Check surgery if failure of conservative measures after 6 months.    Orders:    [facility] (Bariatric Surgery), [place]     mouth movements TD/dystonia

## 2024-12-11 NOTE — ED BEHAVIORAL HEALTH PROGRESS NOTE - NSBHMSEAFFCONG_PSY_A_CORE
12/11/24 1346   Treatment Team Notification   Reason for Communication Review case  (tele)   Name of Team Member Notified Sean   Treatment Team Role Attending Provider   Method of Communication Secure Message   Response Waiting for response   Notification Time 1346     Perfect serve message sent to Dr Estrada to inform him that he is refusing his telemetry.  
Congruent

## 2025-09-04 ENCOUNTER — EMERGENCY (EMERGENCY)
Facility: HOSPITAL | Age: 65
LOS: 1 days | End: 2025-09-04
Attending: STUDENT IN AN ORGANIZED HEALTH CARE EDUCATION/TRAINING PROGRAM
Payer: MEDICARE

## 2025-09-04 VITALS
OXYGEN SATURATION: 98 % | DIASTOLIC BLOOD PRESSURE: 77 MMHG | RESPIRATION RATE: 18 BRPM | HEART RATE: 62 BPM | TEMPERATURE: 97 F | SYSTOLIC BLOOD PRESSURE: 157 MMHG

## 2025-09-04 VITALS
HEART RATE: 81 BPM | DIASTOLIC BLOOD PRESSURE: 108 MMHG | RESPIRATION RATE: 18 BRPM | WEIGHT: 239.86 LBS | SYSTOLIC BLOOD PRESSURE: 201 MMHG | HEIGHT: 72 IN | TEMPERATURE: 98 F | OXYGEN SATURATION: 97 %

## 2025-09-04 DIAGNOSIS — Z98.89 OTHER SPECIFIED POSTPROCEDURAL STATES: Chronic | ICD-10-CM

## 2025-09-04 DIAGNOSIS — Z96.652 PRESENCE OF LEFT ARTIFICIAL KNEE JOINT: Chronic | ICD-10-CM

## 2025-09-04 DIAGNOSIS — F25.9 SCHIZOAFFECTIVE DISORDER, UNSPECIFIED: ICD-10-CM

## 2025-09-04 LAB
ALBUMIN SERPL ELPH-MCNC: 4.4 G/DL — SIGNIFICANT CHANGE UP (ref 3.3–5.2)
ALP SERPL-CCNC: 114 U/L — SIGNIFICANT CHANGE UP (ref 40–120)
ALT FLD-CCNC: 11 U/L — SIGNIFICANT CHANGE UP
AMPHET UR-MCNC: NEGATIVE — SIGNIFICANT CHANGE UP
ANION GAP SERPL CALC-SCNC: 17 MMOL/L — SIGNIFICANT CHANGE UP (ref 5–17)
APAP SERPL-MCNC: <3 UG/ML — LOW (ref 10–26)
APPEARANCE UR: CLEAR — SIGNIFICANT CHANGE UP
AST SERPL-CCNC: 16 U/L — SIGNIFICANT CHANGE UP
BACTERIA # UR AUTO: NEGATIVE /HPF — SIGNIFICANT CHANGE UP
BARBITURATES UR SCN-MCNC: NEGATIVE — SIGNIFICANT CHANGE UP
BASOPHILS # BLD AUTO: 0.04 K/UL — SIGNIFICANT CHANGE UP (ref 0–0.2)
BASOPHILS NFR BLD AUTO: 0.5 % — SIGNIFICANT CHANGE UP (ref 0–2)
BENZODIAZ UR-MCNC: NEGATIVE — SIGNIFICANT CHANGE UP
BILIRUB SERPL-MCNC: 1 MG/DL — SIGNIFICANT CHANGE UP (ref 0.4–2)
BILIRUB UR-MCNC: NEGATIVE — SIGNIFICANT CHANGE UP
BUN SERPL-MCNC: 11.9 MG/DL — SIGNIFICANT CHANGE UP (ref 8–20)
CALCIUM SERPL-MCNC: 9.5 MG/DL — SIGNIFICANT CHANGE UP (ref 8.4–10.5)
CAST: 0 /LPF — SIGNIFICANT CHANGE UP (ref 0–4)
CHLORIDE SERPL-SCNC: 102 MMOL/L — SIGNIFICANT CHANGE UP (ref 96–108)
CO2 SERPL-SCNC: 19 MMOL/L — LOW (ref 22–29)
COCAINE METAB.OTHER UR-MCNC: POSITIVE
COLOR SPEC: YELLOW — SIGNIFICANT CHANGE UP
CREAT SERPL-MCNC: 0.92 MG/DL — SIGNIFICANT CHANGE UP (ref 0.5–1.3)
DIFF PNL FLD: NEGATIVE — SIGNIFICANT CHANGE UP
EGFR: 92 ML/MIN/1.73M2 — SIGNIFICANT CHANGE UP
EGFR: 92 ML/MIN/1.73M2 — SIGNIFICANT CHANGE UP
EOSINOPHIL # BLD AUTO: 0.05 K/UL — SIGNIFICANT CHANGE UP (ref 0–0.5)
EOSINOPHIL NFR BLD AUTO: 0.7 % — SIGNIFICANT CHANGE UP (ref 0–6)
ETHANOL SERPL-MCNC: 26 MG/DL — HIGH (ref 0–9)
FENTANYL UR QL SCN: NEGATIVE — SIGNIFICANT CHANGE UP
GLUCOSE SERPL-MCNC: 124 MG/DL — HIGH (ref 70–99)
GLUCOSE UR QL: NEGATIVE MG/DL — SIGNIFICANT CHANGE UP
HCT VFR BLD CALC: 44.5 % — SIGNIFICANT CHANGE UP (ref 39–50)
HGB BLD-MCNC: 14.2 G/DL — SIGNIFICANT CHANGE UP (ref 13–17)
IMM GRANULOCYTES # BLD AUTO: 0.04 K/UL — SIGNIFICANT CHANGE UP (ref 0–0.07)
IMM GRANULOCYTES NFR BLD AUTO: 0.5 % — SIGNIFICANT CHANGE UP (ref 0–0.9)
KETONES UR QL: NEGATIVE MG/DL — SIGNIFICANT CHANGE UP
LEUKOCYTE ESTERASE UR-ACNC: ABNORMAL
LYMPHOCYTES # BLD AUTO: 2.12 K/UL — SIGNIFICANT CHANGE UP (ref 1–3.3)
LYMPHOCYTES NFR BLD AUTO: 28.2 % — SIGNIFICANT CHANGE UP (ref 13–44)
MCHC RBC-ENTMCNC: 28 PG — SIGNIFICANT CHANGE UP (ref 27–34)
MCHC RBC-ENTMCNC: 31.9 G/DL — LOW (ref 32–36)
MCV RBC AUTO: 87.6 FL — SIGNIFICANT CHANGE UP (ref 80–100)
METHADONE UR-MCNC: NEGATIVE — SIGNIFICANT CHANGE UP
MONOCYTES # BLD AUTO: 0.86 K/UL — SIGNIFICANT CHANGE UP (ref 0–0.9)
MONOCYTES NFR BLD AUTO: 11.4 % — SIGNIFICANT CHANGE UP (ref 2–14)
NEUTROPHILS # BLD AUTO: 4.41 K/UL — SIGNIFICANT CHANGE UP (ref 1.8–7.4)
NEUTROPHILS NFR BLD AUTO: 58.7 % — SIGNIFICANT CHANGE UP (ref 43–77)
NITRITE UR-MCNC: NEGATIVE — SIGNIFICANT CHANGE UP
NRBC # BLD AUTO: 0 K/UL — SIGNIFICANT CHANGE UP (ref 0–0)
NRBC # FLD: 0 K/UL — SIGNIFICANT CHANGE UP (ref 0–0)
NRBC BLD AUTO-RTO: 0 /100 WBCS — SIGNIFICANT CHANGE UP (ref 0–0)
OPIATES UR-MCNC: NEGATIVE — SIGNIFICANT CHANGE UP
PCP SPEC-MCNC: SIGNIFICANT CHANGE UP
PCP UR-MCNC: NEGATIVE — SIGNIFICANT CHANGE UP
PH UR: 6 — SIGNIFICANT CHANGE UP (ref 5–8)
PLATELET # BLD AUTO: 287 K/UL — SIGNIFICANT CHANGE UP (ref 150–400)
PMV BLD: 9.4 FL — SIGNIFICANT CHANGE UP (ref 7–13)
POTASSIUM SERPL-MCNC: 4 MMOL/L — SIGNIFICANT CHANGE UP (ref 3.5–5.3)
POTASSIUM SERPL-SCNC: 4 MMOL/L — SIGNIFICANT CHANGE UP (ref 3.5–5.3)
PROT SERPL-MCNC: 7.5 G/DL — SIGNIFICANT CHANGE UP (ref 6.6–8.7)
PROT UR-MCNC: NEGATIVE MG/DL — SIGNIFICANT CHANGE UP
RBC # BLD: 5.08 M/UL — SIGNIFICANT CHANGE UP (ref 4.2–5.8)
RBC # FLD: 12.1 % — SIGNIFICANT CHANGE UP (ref 10.3–14.5)
RBC CASTS # UR COMP ASSIST: 2 /HPF — SIGNIFICANT CHANGE UP (ref 0–4)
SALICYLATES SERPL-MCNC: <0.6 MG/DL — LOW (ref 10–20)
SODIUM SERPL-SCNC: 138 MMOL/L — SIGNIFICANT CHANGE UP (ref 135–145)
SP GR SPEC: 1.01 — SIGNIFICANT CHANGE UP (ref 1–1.03)
SQUAMOUS # UR AUTO: 2 /HPF — SIGNIFICANT CHANGE UP (ref 0–5)
THC UR QL: NEGATIVE — SIGNIFICANT CHANGE UP
UROBILINOGEN FLD QL: 1 MG/DL — SIGNIFICANT CHANGE UP (ref 0.2–1)
WBC # BLD: 7.52 K/UL — SIGNIFICANT CHANGE UP (ref 3.8–10.5)
WBC # FLD AUTO: 7.52 K/UL — SIGNIFICANT CHANGE UP (ref 3.8–10.5)
WBC UR QL: 5 /HPF — SIGNIFICANT CHANGE UP (ref 0–5)

## 2025-09-04 PROCEDURE — 80053 COMPREHEN METABOLIC PANEL: CPT

## 2025-09-04 PROCEDURE — 93010 ELECTROCARDIOGRAM REPORT: CPT

## 2025-09-04 PROCEDURE — 99223 1ST HOSP IP/OBS HIGH 75: CPT

## 2025-09-04 PROCEDURE — 99232 SBSQ HOSP IP/OBS MODERATE 35: CPT

## 2025-09-04 PROCEDURE — G0378: CPT

## 2025-09-04 PROCEDURE — 36415 COLL VENOUS BLD VENIPUNCTURE: CPT

## 2025-09-04 PROCEDURE — 99284 EMERGENCY DEPT VISIT MOD MDM: CPT | Mod: 25

## 2025-09-04 PROCEDURE — 85025 COMPLETE CBC W/AUTO DIFF WBC: CPT

## 2025-09-04 PROCEDURE — 81001 URINALYSIS AUTO W/SCOPE: CPT

## 2025-09-04 PROCEDURE — 80307 DRUG TEST PRSMV CHEM ANLYZR: CPT

## 2025-09-04 PROCEDURE — 93005 ELECTROCARDIOGRAM TRACING: CPT

## 2025-09-04 RX ORDER — AMLODIPINE BESYLATE 10 MG/1
5 TABLET ORAL ONCE
Refills: 0 | Status: COMPLETED | OUTPATIENT
Start: 2025-09-04 | End: 2025-09-04

## 2025-09-04 RX ADMIN — AMLODIPINE BESYLATE 5 MILLIGRAM(S): 10 TABLET ORAL at 03:05
